# Patient Record
Sex: FEMALE | Race: WHITE | NOT HISPANIC OR LATINO | Employment: UNEMPLOYED | URBAN - METROPOLITAN AREA
[De-identification: names, ages, dates, MRNs, and addresses within clinical notes are randomized per-mention and may not be internally consistent; named-entity substitution may affect disease eponyms.]

---

## 2021-08-20 ENCOUNTER — HOSPITAL ENCOUNTER (EMERGENCY)
Facility: HOSPITAL | Age: 26
Discharge: HOME | End: 2021-08-20
Admitting: EMERGENCY MEDICINE

## 2021-08-20 ENCOUNTER — APPOINTMENT (OUTPATIENT)
Dept: ULTRASOUND IMAGING | Facility: HOSPITAL | Age: 26
End: 2021-08-20

## 2021-08-20 VITALS
TEMPERATURE: 97.6 F | RESPIRATION RATE: 16 BRPM | WEIGHT: 102.51 LBS | BODY MASS INDEX: 18.16 KG/M2 | SYSTOLIC BLOOD PRESSURE: 110 MMHG | HEIGHT: 63 IN | DIASTOLIC BLOOD PRESSURE: 80 MMHG | HEART RATE: 80 BPM | OXYGEN SATURATION: 100 %

## 2021-08-20 DIAGNOSIS — O41.8X10 SUBCHORIONIC HEMORRHAGE OF PLACENTA IN FIRST TRIMESTER, SINGLE OR UNSPECIFIED FETUS: ICD-10-CM

## 2021-08-20 DIAGNOSIS — N93.9 VAGINAL BLEEDING: ICD-10-CM

## 2021-08-20 DIAGNOSIS — R11.2 NAUSEA AND VOMITING, INTRACTABILITY OF VOMITING NOT SPECIFIED, UNSPECIFIED VOMITING TYPE: Primary | ICD-10-CM

## 2021-08-20 DIAGNOSIS — O46.8X1 SUBCHORIONIC HEMORRHAGE OF PLACENTA IN FIRST TRIMESTER, SINGLE OR UNSPECIFIED FETUS: ICD-10-CM

## 2021-08-20 LAB
ABO GROUP BLD: NORMAL
ANION GAP SERPL CALCULATED.3IONS-SCNC: 16 MMOL/L (ref 5–15)
BACTERIA UR QL AUTO: ABNORMAL /HPF
BASOPHILS # BLD AUTO: 0 10*3/MM3 (ref 0–0.2)
BASOPHILS NFR BLD AUTO: 0.6 % (ref 0–1.5)
BILIRUB UR QL STRIP: NEGATIVE
BUN SERPL-MCNC: 8 MG/DL (ref 6–20)
BUN/CREAT SERPL: 14.5 (ref 7–25)
CALCIUM SPEC-SCNC: 9.5 MG/DL (ref 8.6–10.5)
CHLORIDE SERPL-SCNC: 101 MMOL/L (ref 98–107)
CLARITY UR: ABNORMAL
CO2 SERPL-SCNC: 19 MMOL/L (ref 22–29)
COLOR UR: ABNORMAL
CREAT SERPL-MCNC: 0.55 MG/DL (ref 0.57–1)
DEPRECATED RDW RBC AUTO: 38.9 FL (ref 37–54)
EOSINOPHIL # BLD AUTO: 0 10*3/MM3 (ref 0–0.4)
EOSINOPHIL NFR BLD AUTO: 0 % (ref 0.3–6.2)
ERYTHROCYTE [DISTWIDTH] IN BLOOD BY AUTOMATED COUNT: 12.7 % (ref 12.3–15.4)
GFR SERPL CREATININE-BSD FRML MDRD: 136 ML/MIN/1.73
GLUCOSE SERPL-MCNC: 89 MG/DL (ref 65–99)
GLUCOSE UR STRIP-MCNC: NEGATIVE MG/DL
HCG INTACT+B SERPL-ACNC: NORMAL MIU/ML
HCT VFR BLD AUTO: 40.1 % (ref 34–46.6)
HGB BLD-MCNC: 13.8 G/DL (ref 12–15.9)
HGB UR QL STRIP.AUTO: NEGATIVE
HYALINE CASTS UR QL AUTO: ABNORMAL /LPF
KETONES UR QL STRIP: ABNORMAL
LEUKOCYTE ESTERASE UR QL STRIP.AUTO: NEGATIVE
LYMPHOCYTES # BLD AUTO: 1.2 10*3/MM3 (ref 0.7–3.1)
LYMPHOCYTES NFR BLD AUTO: 15.7 % (ref 19.6–45.3)
MCH RBC QN AUTO: 30 PG (ref 26.6–33)
MCHC RBC AUTO-ENTMCNC: 34.4 G/DL (ref 31.5–35.7)
MCV RBC AUTO: 87.2 FL (ref 79–97)
MONOCYTES # BLD AUTO: 0.5 10*3/MM3 (ref 0.1–0.9)
MONOCYTES NFR BLD AUTO: 6.3 % (ref 5–12)
MUCOUS THREADS URNS QL MICRO: ABNORMAL /HPF
NEUTROPHILS NFR BLD AUTO: 5.9 10*3/MM3 (ref 1.7–7)
NEUTROPHILS NFR BLD AUTO: 77.4 % (ref 42.7–76)
NITRITE UR QL STRIP: NEGATIVE
NRBC BLD AUTO-RTO: 0 /100 WBC (ref 0–0.2)
PH UR STRIP.AUTO: 6 [PH] (ref 5–8)
PLATELET # BLD AUTO: 254 10*3/MM3 (ref 140–450)
PMV BLD AUTO: 8.8 FL (ref 6–12)
POTASSIUM SERPL-SCNC: 3.6 MMOL/L (ref 3.5–5.2)
PROT UR QL STRIP: ABNORMAL
RBC # BLD AUTO: 4.59 10*6/MM3 (ref 3.77–5.28)
RBC # UR: ABNORMAL /HPF
REF LAB TEST METHOD: ABNORMAL
RH BLD: POSITIVE
SARS-COV-2 RNA PNL SPEC NAA+PROBE: NOT DETECTED
SODIUM SERPL-SCNC: 136 MMOL/L (ref 136–145)
SP GR UR STRIP: >=1.03 (ref 1–1.03)
SQUAMOUS #/AREA URNS HPF: ABNORMAL /HPF
UROBILINOGEN UR QL STRIP: ABNORMAL
WBC # BLD AUTO: 7.6 10*3/MM3 (ref 3.4–10.8)
WBC UR QL AUTO: ABNORMAL /HPF

## 2021-08-20 PROCEDURE — 86901 BLOOD TYPING SEROLOGIC RH(D): CPT | Performed by: NURSE PRACTITIONER

## 2021-08-20 PROCEDURE — 85025 COMPLETE CBC W/AUTO DIFF WBC: CPT | Performed by: NURSE PRACTITIONER

## 2021-08-20 PROCEDURE — 86900 BLOOD TYPING SEROLOGIC ABO: CPT | Performed by: NURSE PRACTITIONER

## 2021-08-20 PROCEDURE — 84702 CHORIONIC GONADOTROPIN TEST: CPT | Performed by: NURSE PRACTITIONER

## 2021-08-20 PROCEDURE — 81001 URINALYSIS AUTO W/SCOPE: CPT | Performed by: NURSE PRACTITIONER

## 2021-08-20 PROCEDURE — 87635 SARS-COV-2 COVID-19 AMP PRB: CPT | Performed by: NURSE PRACTITIONER

## 2021-08-20 PROCEDURE — 93976 VASCULAR STUDY: CPT

## 2021-08-20 PROCEDURE — 25010000002 METOCLOPRAMIDE PER 10 MG: Performed by: NURSE PRACTITIONER

## 2021-08-20 PROCEDURE — 99283 EMERGENCY DEPT VISIT LOW MDM: CPT

## 2021-08-20 PROCEDURE — 96374 THER/PROPH/DIAG INJ IV PUSH: CPT

## 2021-08-20 PROCEDURE — 76801 OB US < 14 WKS SINGLE FETUS: CPT

## 2021-08-20 PROCEDURE — 80048 BASIC METABOLIC PNL TOTAL CA: CPT | Performed by: NURSE PRACTITIONER

## 2021-08-20 RX ORDER — METOCLOPRAMIDE 10 MG/1
10 TABLET ORAL 3 TIMES DAILY PRN
Qty: 12 TABLET | Refills: 0 | Status: SHIPPED | OUTPATIENT
Start: 2021-08-20

## 2021-08-20 RX ORDER — METOCLOPRAMIDE HYDROCHLORIDE 5 MG/ML
10 INJECTION INTRAMUSCULAR; INTRAVENOUS ONCE
Status: COMPLETED | OUTPATIENT
Start: 2021-08-20 | End: 2021-08-20

## 2021-08-20 RX ORDER — SODIUM CHLORIDE 0.9 % (FLUSH) 0.9 %
10 SYRINGE (ML) INJECTION AS NEEDED
Status: DISCONTINUED | OUTPATIENT
Start: 2021-08-20 | End: 2021-08-20 | Stop reason: HOSPADM

## 2021-08-20 RX ADMIN — METOCLOPRAMIDE 10 MG: 5 INJECTION, SOLUTION INTRAMUSCULAR; INTRAVENOUS at 11:15

## 2021-08-20 RX ADMIN — SODIUM CHLORIDE 1000 ML: 9 INJECTION, SOLUTION INTRAVENOUS at 11:15

## 2021-08-20 NOTE — DISCHARGE INSTRUCTIONS
Take Reglan as prescribed. Drink plenty of fluids. Pelvic rest. Close follow-up with your OB/GYN. Call them today for an appointment. Return for new or worsening symptoms.

## 2021-08-20 NOTE — ED NOTES
Pt came into the ER states she is 7ld5ryik pregnant. Pt woke up this morning and had dark red vaginal bleeding. Pt reports her first time being pregnant.      Krystle Shaw RN  08/20/21 1127

## 2021-08-20 NOTE — ED PROVIDER NOTES
"Subjective   Patient is a 26-year old white female with complaints of nausea and vomiting since yesterday around noon.  She reports multiple episodes of vomiting this morning with continued nausea.  She denies diarrhea.  This morning she noticed blood in the toilet \"that wasn't only there when I wiped.\" She is unsure how much she bled but does not think it would be enough to soak a pad.  She states she is had no further bleeding.  She reports being 7-8 weeks pregnant by quantitative HCG blood test at her PCP. She reports dizziness upon standing and is feeling weak but thinks that is due to the vomiting. She has only had one episode of intercourse during her pregnancy and reports having O+ blood type. This is her first pregnancy and she does not have an established OBGYN. She reports no significant history.  She complains of some soreness in her upper abdomen which she thinks is from vomiting.  She denies any lower abdominal pain or cramping.  She denies any back pain.  She denies any urinary complaint.           Review of Systems   Constitutional: Negative for fever.   Gastrointestinal: Positive for nausea and vomiting.   Genitourinary: Positive for vaginal bleeding. Negative for dysuria, flank pain, genital sores and vaginal discharge.   Neurological: Positive for dizziness and weakness. Negative for headaches.       No past medical history on file.    No Known Allergies    No past surgical history on file.    No family history on file.    Social History     Socioeconomic History   • Marital status:      Spouse name: Not on file   • Number of children: Not on file   • Years of education: Not on file   • Highest education level: Not on file           Objective   Physical Exam  Exam conducted with a chaperone present.   Constitutional:       Appearance: Normal appearance.   HENT:      Head: Normocephalic.      Nose: Nose normal.   Eyes:      Pupils: Pupils are equal, round, and reactive to light. "   Cardiovascular:      Rate and Rhythm: Tachycardia present.      Pulses: Normal pulses.      Heart sounds: Normal heart sounds.   Pulmonary:      Effort: Pulmonary effort is normal.      Breath sounds: Normal breath sounds.   Abdominal:      General: Abdomen is flat.      Palpations: Abdomen is soft.   Genitourinary:     General: Normal vulva.      Labia:         Right: No rash or injury.         Left: No rash or injury.       Vagina: No vaginal discharge.   Musculoskeletal:      Cervical back: Normal range of motion.   Skin:     General: Skin is warm and dry.   Neurological:      Mental Status: She is alert.         Procedures           ED Course      Labs Reviewed   BASIC METABOLIC PANEL - Abnormal; Notable for the following components:       Result Value    Creatinine 0.55 (*)     CO2 19.0 (*)     Anion Gap 16.0 (*)     All other components within normal limits    Narrative:     GFR Normal >60  Chronic Kidney Disease <60  Kidney Failure <15     URINALYSIS W/ CULTURE IF INDICATED - Abnormal; Notable for the following components:    Color, UA Dark Yellow (*)     Appearance, UA Cloudy (*)     Ketones, UA >=160 mg/dL (4+) (*)     Protein,  mg/dL (2+) (*)     All other components within normal limits   CBC WITH AUTO DIFFERENTIAL - Abnormal; Notable for the following components:    Neutrophil % 77.4 (*)     Lymphocyte % 15.7 (*)     Eosinophil % 0.0 (*)     All other components within normal limits   URINALYSIS, MICROSCOPIC ONLY - Abnormal; Notable for the following components:    RBC, UA 0-2 (*)     WBC, UA 3-5 (*)     Bacteria, UA Trace (*)     Squamous Epithelial Cells, UA 3-6 (*)     Mucus, UA Moderate/2+ (*)     All other components within normal limits   COVID-19,CEPHEID/VIRGEN/BDMAX,COR/CHARLIE/PAD/ROSIE IN-HOUSE,NP SWAB IN TRANSPORT MEDIA 3-4 HR TAT, RT-PCR - Normal    Narrative:     Fact sheet for providers: https://www.fda.gov/media/642900/download     Fact sheet for patients:  https://www.fda.gov/media/721479/download  Fact sheet for providers: https://www.fda.gov/media/286316/download    Fact sheet for patients: https://www.fda.gov/media/605349/download    Test performed by PCR.   HCG, QUANTITATIVE, PREGNANCY    Narrative:     HCG Ranges by Gestational Age    Females - non-pregnant premenopausal   </= 1mIU/mL HCG  Females - postmenopausal               </= 7mIU/mL HCG    3 Weeks         5.8 -    71.2 mIU/mL  4 Weeks         9.5 -     750 mIU/mL  5 Weeks         217 -   7,138 mIU/mL  6 Weeks         158 -  31,795 mIU/mL  7 Weeks       3,697 - 163,563 mIU/mL  8 Weeks      32,065 - 149,571 mIU/mL  9 Weeks      63,803 - 151,410 mIU/mL  10 Weeks     46,509 - 186,977 mIU/mL  12 Weeks     27,832 - 210,612 mIU/mL  14 Weeks     13,950 -  62,530 mIU/mL  15 Weeks     12,039 -  70,971 mIU/mL  16 Weeks      9,040 -  56,451 mIU/mL  17 Weeks      8,175 -  55,868 mIU/mL  18 Weeks      8,099 -  58,176 mIU/mL  Results may be falsely decreased if patient taking Biotin.     ABO/RH   CBC AND DIFFERENTIAL    Narrative:     The following orders were created for panel order CBC & Differential.  Procedure                               Abnormality         Status                     ---------                               -----------         ------                     CBC Auto Differential[[id number]]        Abnormal            Final result                 Please view results for these tests on the individual orders.     US Ob < 14 Weeks Single or First Gestation    Result Date: 8/20/2021   IMPRESSION:  1.  Single living intrauterine pregnancy with an estimated gestational age of 6 weeks and 6 days based on crown-rump length. 2.  Small subchorionic hemorrhage measuring 1.7 x 0.4 cm.  Electronically Signed By-Abelardo Lieberman MD On:8/20/2021 1:39 PM This report was finalized on [id number] by  Abelardo Lieberman MD.    Medications   sodium chloride 0.9 % flush 10 mL (has no administration in time range)   sodium  "chloride 0.9 % bolus 1,000 mL (1,000 mL Intravenous New Bag 8/20/21 1115)   metoclopramide (REGLAN) injection 10 mg (10 mg Intravenous Given 8/20/21 1115)                                          MDM  Number of Diagnoses or Management Options  Nausea and vomiting, intractability of vomiting not specified, unspecified vomiting type  Subchorionic hemorrhage of placenta in first trimester, single or unspecified fetus  Vaginal bleeding  Diagnosis management comments: Comorbidities: 7 to 8 weeks pregnant  Differentials: Dehydration, hyperemesis gravidarum, electrolyte disturbance, threatened AB, spontaneous AB;this list is not all inclusive and does not constitute the entirety of considered causes  Discussion with provider:  Radiology interpretation: X-rays reviewed by me and interpreted by radiologist: As above  Lab interpretation: Labs viewed by me significant for: As above    Attempted pelvic exam patient did not tolerate due to pain with speculum.    Patient had IV established.  She had labs and ultrasound obtained.  She was given a fluid bolus as well as Reglan for nausea.    Work-up: CBC is gross unremarkable. Metabolic panel also grossly unremarkable. Urinalysis shows greater than 160 ketones but no blood or sign of infection. Serum hCG 176,000. Covid negative.  Pregnancy ultrasound shows single living intrauterine pregnancy with an estimated gestational age of 6 weeks and 6 days with fetal heart rate 132 bpm. Small subchorionic hemorrhage measuring 1.7 x 0.4 cm.    On reexamination patient is resting comfortably. She states she is feeling better after Reglan. She declined any further IV fluids. She only received approximately 100 cc of saline here as she stated it was \"too cold\" and had the nurse discontinue it. She also declines any further attempts at pelvic exam.    She is well-appearing and in no distress. She has stable vital signs. She has had no further vaginal bleeding. She denies any abdominal or pelvic " pain currently.    Diagnosis and treatment plan discussed with patient.  Patient agreeable to plan.   I discussed findings with patient who voices understanding of discharge instructions, signs and symptoms requiring return to ED; discharged improved and in stable condition with follow up for re-evaluation.  Prescription for Reglan.       Amount and/or Complexity of Data Reviewed  Clinical lab tests: ordered and reviewed  Tests in the radiology section of CPT®: ordered and reviewed    Patient Progress  Patient progress: stable      Final diagnoses:   Nausea and vomiting, intractability of vomiting not specified, unspecified vomiting type   Vaginal bleeding   Subchorionic hemorrhage of placenta in first trimester, single or unspecified fetus       ED Disposition  ED Disposition     ED Disposition Condition Comment    Discharge Stable           Your OB/GYN    Schedule an appointment as soon as possible for a visit       Joanne Ignacio MD  On license of [facility] [place] [address] [phone]    Schedule an appointment as soon as possible for a visit            Medication List      New Prescriptions    metoclopramide 10 MG tablet  Commonly known as: REGLAN  Take 1 tablet by mouth 3 (Three) Times a Day As Needed (nausea).           Where to Get Your Medications      These medications were sent to [id number] - [place], [place] - [address] [address] AT [facility] [address] - [phone] [facility] [phone] fx [address] [place] [address]    Phone: [phone]   · metoclopramide 10 MG tablet          Denise Riley APRN  08/20/21 6606

## 2021-09-17 LAB — EXTERNAL RUBELLA QUALITATIVE: NORMAL

## 2022-03-12 ENCOUNTER — HOSPITAL ENCOUNTER (INPATIENT)
Facility: HOSPITAL | Age: 27
LOS: 2 days | Discharge: HOME | End: 2022-03-14
Attending: OBSTETRICS & GYNECOLOGY | Admitting: OBSTETRICS & GYNECOLOGY

## 2022-03-12 PROBLEM — O99.340 ANXIETY DURING PREGNANCY: Status: ACTIVE | Noted: 2021-09-16

## 2022-03-12 PROBLEM — F41.9 ANXIETY DURING PREGNANCY: Status: ACTIVE | Noted: 2021-09-16

## 2022-03-12 PROBLEM — A74.9 CHLAMYDIA: Status: ACTIVE | Noted: 2021-10-14

## 2022-03-12 PROBLEM — Z72.0 TOBACCO USE: Status: ACTIVE | Noted: 2022-02-24

## 2022-03-12 LAB
ABO GROUP BLD: NORMAL
ABO GROUP BLD: NORMAL
BLD GP AB SCN SERPL QL: NEGATIVE
DEPRECATED RDW RBC AUTO: 38.8 FL (ref 37–54)
ERYTHROCYTE [DISTWIDTH] IN BLOOD BY AUTOMATED COUNT: 12.7 % (ref 12.3–15.4)
HCT VFR BLD AUTO: 29.5 % (ref 34–46.6)
HGB BLD-MCNC: 9.8 G/DL (ref 12–15.9)
MCH RBC QN AUTO: 28.1 PG (ref 26.6–33)
MCHC RBC AUTO-ENTMCNC: 33.2 G/DL (ref 31.5–35.7)
MCV RBC AUTO: 84.5 FL (ref 79–97)
PLATELET # BLD AUTO: 245 10*3/MM3 (ref 140–450)
PMV BLD AUTO: 10.1 FL (ref 6–12)
RBC # BLD AUTO: 3.49 10*6/MM3 (ref 3.77–5.28)
RH BLD: POSITIVE
RH BLD: POSITIVE
SARS-COV-2 RNA PNL SPEC NAA+PROBE: NOT DETECTED
T&S EXPIRATION DATE: NORMAL
WBC NRBC COR # BLD: 20.96 10*3/MM3 (ref 3.4–10.8)

## 2022-03-12 PROCEDURE — 25010000002 KETOROLAC TROMETHAMINE PER 15 MG: Performed by: OBSTETRICS & GYNECOLOGY

## 2022-03-12 PROCEDURE — 88307 TISSUE EXAM BY PATHOLOGIST: CPT | Performed by: OBSTETRICS & GYNECOLOGY

## 2022-03-12 PROCEDURE — 86900 BLOOD TYPING SEROLOGIC ABO: CPT | Performed by: OBSTETRICS & GYNECOLOGY

## 2022-03-12 PROCEDURE — 86901 BLOOD TYPING SEROLOGIC RH(D): CPT

## 2022-03-12 PROCEDURE — 86900 BLOOD TYPING SEROLOGIC ABO: CPT

## 2022-03-12 PROCEDURE — U0004 COV-19 TEST NON-CDC HGH THRU: HCPCS | Performed by: OBSTETRICS & GYNECOLOGY

## 2022-03-12 PROCEDURE — 85027 COMPLETE CBC AUTOMATED: CPT | Performed by: OBSTETRICS & GYNECOLOGY

## 2022-03-12 PROCEDURE — 86901 BLOOD TYPING SEROLOGIC RH(D): CPT | Performed by: OBSTETRICS & GYNECOLOGY

## 2022-03-12 PROCEDURE — 86850 RBC ANTIBODY SCREEN: CPT | Performed by: OBSTETRICS & GYNECOLOGY

## 2022-03-12 RX ORDER — DOCUSATE SODIUM 100 MG/1
100 CAPSULE, LIQUID FILLED ORAL 2 TIMES DAILY
Status: DISCONTINUED | OUTPATIENT
Start: 2022-03-12 | End: 2022-03-14 | Stop reason: HOSPADM

## 2022-03-12 RX ORDER — CALCIUM CARBONATE 200(500)MG
2 TABLET,CHEWABLE ORAL 3 TIMES DAILY PRN
Status: DISCONTINUED | OUTPATIENT
Start: 2022-03-12 | End: 2022-03-14 | Stop reason: HOSPADM

## 2022-03-12 RX ORDER — ONDANSETRON 4 MG/1
4 TABLET, FILM COATED ORAL EVERY 6 HOURS PRN
Status: DISCONTINUED | OUTPATIENT
Start: 2022-03-12 | End: 2022-03-14 | Stop reason: HOSPADM

## 2022-03-12 RX ORDER — NICOTINE 21 MG/24HR
1 PATCH, TRANSDERMAL 24 HOURS TRANSDERMAL EVERY 24 HOURS
Status: DISCONTINUED | OUTPATIENT
Start: 2022-03-12 | End: 2022-03-14 | Stop reason: HOSPADM

## 2022-03-12 RX ORDER — HYDROCODONE BITARTRATE AND ACETAMINOPHEN 5; 325 MG/1; MG/1
1 TABLET ORAL EVERY 4 HOURS PRN
Status: DISCONTINUED | OUTPATIENT
Start: 2022-03-12 | End: 2022-03-14 | Stop reason: HOSPADM

## 2022-03-12 RX ORDER — ONDANSETRON 2 MG/ML
4 INJECTION INTRAMUSCULAR; INTRAVENOUS EVERY 6 HOURS PRN
Status: DISCONTINUED | OUTPATIENT
Start: 2022-03-12 | End: 2022-03-14 | Stop reason: HOSPADM

## 2022-03-12 RX ORDER — SODIUM CHLORIDE 0.9 % (FLUSH) 0.9 %
1-10 SYRINGE (ML) INJECTION AS NEEDED
Status: DISCONTINUED | OUTPATIENT
Start: 2022-03-12 | End: 2022-03-14 | Stop reason: HOSPADM

## 2022-03-12 RX ORDER — IBUPROFEN 600 MG/1
600 TABLET ORAL EVERY 6 HOURS SCHEDULED
Status: DISCONTINUED | OUTPATIENT
Start: 2022-03-12 | End: 2022-03-14 | Stop reason: SDUPTHER

## 2022-03-12 RX ORDER — OXYTOCIN-SODIUM CHLORIDE 0.9% IV SOLN 30 UNIT/500ML 30-0.9/5 UT/ML-%
50 SOLUTION INTRAVENOUS CONTINUOUS
Status: DISCONTINUED | OUTPATIENT
Start: 2022-03-12 | End: 2022-03-14 | Stop reason: HOSPADM

## 2022-03-12 RX ORDER — KETOROLAC TROMETHAMINE 30 MG/ML
30 INJECTION, SOLUTION INTRAMUSCULAR; INTRAVENOUS ONCE
Status: COMPLETED | OUTPATIENT
Start: 2022-03-12 | End: 2022-03-12

## 2022-03-12 RX ORDER — SERTRALINE HYDROCHLORIDE 100 MG/1
100 TABLET, FILM COATED ORAL DAILY
COMMUNITY

## 2022-03-12 RX ORDER — SERTRALINE HYDROCHLORIDE 100 MG/1
100 TABLET, FILM COATED ORAL DAILY
Status: DISCONTINUED | OUTPATIENT
Start: 2022-03-12 | End: 2022-03-14 | Stop reason: HOSPADM

## 2022-03-12 RX ADMIN — DOCUSATE SODIUM 100 MG: 100 CAPSULE, LIQUID FILLED ORAL at 20:59

## 2022-03-12 RX ADMIN — WITCH HAZEL 1 PAD: 500 SOLUTION RECTAL; TOPICAL at 14:59

## 2022-03-12 RX ADMIN — OXYTOCIN 50 ML/HR: 10 INJECTION, SOLUTION INTRAMUSCULAR; INTRAVENOUS at 15:00

## 2022-03-12 RX ADMIN — IBUPROFEN 600 MG: 600 TABLET ORAL at 17:45

## 2022-03-12 RX ADMIN — Medication: at 14:59

## 2022-03-12 RX ADMIN — KETOROLAC TROMETHAMINE 30 MG: 30 INJECTION, SOLUTION INTRAMUSCULAR; INTRAVENOUS at 12:42

## 2022-03-12 RX ADMIN — IBUPROFEN 600 MG: 600 TABLET ORAL at 23:45

## 2022-03-12 RX ADMIN — SERTRALINE 100 MG: 100 TABLET, FILM COATED ORAL at 16:01

## 2022-03-12 RX ADMIN — NICOTINE 1 PATCH: 21 PATCH, EXTENDED RELEASE TRANSDERMAL at 15:00

## 2022-03-12 NOTE — H&P
MARTINE Cobb  Obstetric History and Physical    Chief Complaint   Patient presents with   • home delivery     Pt. Brought in by EMS. Home delivery       Subjective     Patient is a 26 y.o. female  currently at 36w0d, who was brought in by EMS following home delivery.  Placenta delivered in route.  Patient reports that she started having contractions at 0900 this morning.    Her prenatal care is at [facility].  Her previous obstetric/gynecological history is noted for being unremarkable        Prenatal Information:  Prenatal Results     POC Urine Glucose/Protein     Test Value Reference Range Date Time    Urine Glucose        Urine Protein              Initial Prenatal Labs     Test Value Reference Range Date Time    Hemoglobin ^ 11.4 g/dL 12.0 - 16.0 22 1401      ^ 12.5 g/dL 12.0 - 16.0 21 1401       13.8 g/dL 12.0 - 15.9 21 1115    Hematocrit ^ 34.8 % 36.0 - 46.0 22 1401      ^ 37.7 % 36.0 - 46.0 21 1401       40.1 % 34.0 - 46.6 21 1115    Platelets ^ 227 10*3/uL 140 - 440 22 1401      ^ 245 10*3/uL 140 - 440 21 1401       254 10*3/mm3 140 - 450 21 1115    Rubella IgG        Hepatitis B SAg ^ Nonreactive  Nonreactive 21 1401    Hepatitis C Ab ^ Nonreactive  Nonreactive 21 1401    RPR        ABO  O   21 1115    Rh  Positive   21 1115    Antibody Screen ^ Negative   22 1401      ^ Negative   21 1401    HIV ^ Nonreactive  Nonreactive 21 1401    Urine Culture        Gonorrhea        Chlamydia        TSH        HgB A1c  ^ 4.4 % 4.3 - 5.6 21 1401          2nd and 3rd Trimester     Test Value Reference Range Date Time    Hemoglobin (repeated) ^ 11.4 g/dL 12.0 - 16.0 22 1401    Hematocrit (repeated) ^ 34.8 % 36.0 - 46.0 22 1401    Platelets  ^ 227 10*3/uL 140 - 440 22 1401      ^ 245 10*3/uL 140 - 440 21 1401       254 10*3/mm3 140 - 450 21 1115    GCT        Antibody Screen (repeated) ^  Negative   01/03/22 1401    GTT Fasting        GTT 1 Hr        GTT 2 Hr        GTT 3 Hr        Group B Strep              Drug Screening     Test Value Reference Range Date Time    Amphetamine Screen        Barbiturate Screen        Benzodiazepine Screen        Methadone Screen        Phencyclidine Screen        Opiates Screen        THC Screen        Cocaine Screen        Propoxyphene Screen        Buprenorphine Screen        Methamphetamine Screen        Oxycodone Screen        Tricyclic Antidepressants Screen              Other (Risk screening)     Test Value Reference Range Date Time    Varicella IgG ^ 0.9 AI  09/17/21 1401    Parvovirus IgG        CMV IgG        Cystic Fibrosis        Hemoglobin electrophoresis        NIPT        MSAFP-4        AFP (for NTD only)              Legend    ^: Historical                      External Prenatal Results     Pregnancy Outside Results - Transcribed From Office Records - See Scanned Records For Details     Test Value Date Time    ABO  O  08/20/21 1115    Rh  Positive  08/20/21 1115    Antibody Screen ^ Negative  01/03/22 1401      ^ Negative  09/17/21 1401    Varicella IgG ^ 0.9 AI 09/17/21 1401    Rubella       Hgb ^ 11.4 g/dL 01/03/22 1401      ^ 12.5 g/dL 09/17/21 1401       13.8 g/dL 08/20/21 1115    Hct ^ 34.8 % 01/03/22 1401      ^ 37.7 % 09/17/21 1401       40.1 % 08/20/21 1115    Glucose Fasting GTT       Glucose Tolerance Test 1 hour       Glucose Tolerance Test 3 hour       Gonorrhea (discrete)       Chlamydia (discrete)       RPR       VDRL       Syphilis Antibody ^ <0.2 AI 09/17/21 1401    HBsAg ^ Nonreactive  09/17/21 1401    Herpes Simplex Virus PCR       Herpes Simplex VIrus Culture       HIV ^ Nonreactive  09/17/21 1401    Hep C RNA Quant PCR       Hep C Antibody ^ Nonreactive  09/17/21 1401    AFP       Group B Strep       GBS Susceptibility to Clindamycin       GBS Susceptibility to Erythromycin       Fetal Fibronectin       Genetic Testing, Maternal  Blood             Drug Screening     Test Value Date Time    Urine Drug Screen       Amphetamine Screen       Barbiturate Screen       Benzodiazepine Screen       Methadone Screen       Phencyclidine Screen       Opiates Screen       THC Screen       Cocaine Screen       Propoxyphene Screen       Buprenorphine Screen       Methamphetamine Screen       Oxycodone Screen       Tricyclic Antidepressants Screen             Legend    ^: Historical                         Past OB History:     OB History    Para Term  AB Living   1 0 0 0 0 0   SAB IAB Ectopic Molar Multiple Live Births   0 0 0 0 0 0      # Outcome Date GA Lbr Heladio/2nd Weight Sex Delivery Anes PTL Lv   1 Current                Past Medical History: Past Medical History:   Diagnosis Date   • Anxiety       Past Surgical History Past Surgical History:   Procedure Laterality Date   • WISDOM TOOTH EXTRACTION        Family History: No family history on file.   Social History:  reports that she has been smoking cigarettes. She has a 2.00 pack-year smoking history. She does not have any smokeless tobacco history on file.   reports no history of alcohol use.   reports no history of drug use.        General ROS: Pertinent items are noted in HPI    Objective       Vital Signs Range for the last 24 hours  Temperature:     Temp Source:     BP:     Pulse:     Respirations:     SPO2:     O2 Amount (l/min):     O2 Devices     Weight: Weight:  [58.1 kg (128 lb)] 58.1 kg (128 lb)     Physical Examination: General appearance - alert, well appearing, and in no distress  Mental status - alert, oriented to person, place, and time  Chest - clear to auscultation, no wheezes, rales or rhonchi, symmetric air entry  Heart - normal rate, regular rhythm, normal S1, S2, no murmurs, rubs, clicks or gallops  Abdomen - soft, nontender, nondistended, no masses or organomegaly  Pelvic -uterus firm at U- 3 station  Minimal vaginal bleeding present.                                         GBS is unknown      Assessment/Plan       Postpartum care following vaginal delivery    A/P:  Patient is a 26-year-old  1 para 0 female at 36 weeks estimate gestational age who is status post home vaginal delivery.  Placenta delivered in route.  I did pelvic exam and there is no lacerations present.  Uterus was firm and there was minimal bleeding.  Will admit patient to postpartum for routine postpartum care.      Victorino Melvin MD  3/12/2022  12:44 EST

## 2022-03-12 NOTE — PLAN OF CARE
Goal Outcome Evaluation:    Problem: Adult Inpatient Plan of Care  Goal: Absence of Hospital-Acquired Illness or Injury  Intervention: Identify and Manage Fall Risk  Recent Flowsheet Documentation  Taken 3/12/2022 1745 by Emily Cantor RN  Safety Promotion/Fall Prevention: safety round/check completed  Taken 3/12/2022 1700 by Emily Cantor RN  Safety Promotion/Fall Prevention: safety round/check completed  Taken 3/12/2022 1435 by Emily Cantor RN  Safety Promotion/Fall Prevention: safety round/check completed  Taken 3/12/2022 1335 by Emiyl Cantor RN  Safety Promotion/Fall Prevention: safety round/check completed  Taken 3/12/2022 1220 by Emily Cantor RN  Safety Promotion/Fall Prevention: safety round/check completed     Problem: Adult Inpatient Plan of Care  Goal: Optimal Comfort and Wellbeing  Outcome: Ongoing, Progressing     Problem: Adult Inpatient Plan of Care  Goal: Readiness for Transition of Care  Outcome: Ongoing, Progressing  Intervention: Mutually Develop Transition Plan  Recent Flowsheet Documentation  Taken 3/12/2022 1252 by Emily Cantor RN  Transportation Concerns: none  Taken 3/12/2022 1250 by Emily Cantor RN  Equipment Currently Used at Home: none     Problem: Adult Inpatient Plan of Care  Goal: Readiness for Transition of Care  Intervention: Mutually Develop Transition Plan  Recent Flowsheet Documentation  Taken 3/12/2022 1252 by Emily Cantor RN  Transportation Concerns: none  Taken 3/12/2022 1250 by Emily Cantor RN  Equipment Currently Used at Home: none     Problem: Infection (Postpartum Vaginal Delivery)  Goal: Absence of Infection Signs/Symptoms  Outcome: Ongoing, Progressing  Intervention: Prevent or Manage Infection  Recent Flowsheet Documentation  Taken 3/12/2022 1745 by Emily Cantor RN  Perineal Care:   absorbent pad changed   perineum cleansed  Taken 3/12/2022 1505 by Emily Cantor RN  Perineal Care:   absorbent pad changed    perineum cleansed     Problem: Pain (Postpartum Vaginal Delivery)  Goal: Acceptable Pain Control  Outcome: Ongoing, Progressing       Plan of Care Reviewed With: patient      Progress: improving

## 2022-03-13 PROCEDURE — G0463 HOSPITAL OUTPT CLINIC VISIT: HCPCS

## 2022-03-13 RX ADMIN — SERTRALINE 100 MG: 100 TABLET, FILM COATED ORAL at 08:53

## 2022-03-13 RX ADMIN — NICOTINE 1 PATCH: 21 PATCH, EXTENDED RELEASE TRANSDERMAL at 15:26

## 2022-03-13 RX ADMIN — DOCUSATE SODIUM 100 MG: 100 CAPSULE, LIQUID FILLED ORAL at 21:19

## 2022-03-13 RX ADMIN — IBUPROFEN 600 MG: 600 TABLET ORAL at 17:26

## 2022-03-13 RX ADMIN — DOCUSATE SODIUM 100 MG: 100 CAPSULE, LIQUID FILLED ORAL at 08:53

## 2022-03-13 RX ADMIN — IBUPROFEN 600 MG: 600 TABLET ORAL at 06:19

## 2022-03-13 NOTE — PROGRESS NOTES
" Cobb  Vaginal Delivery Progress Note    Subjective   Postpartum Day 1: Vaginal Delivery    The patient feels well.  Her pain is well controlled with nonsteroidal anti-inflammatory drugs and Tylenol.   She is ambulating well.  Patient describes her bleeding as moderate lochia.    Breastfeeding: declines.    Objective     Vital Signs Range for the last 24 hours  Temperature: Temp:  [97.3 °F (36.3 °C)-98.8 °F (37.1 °C)] 97.3 °F (36.3 °C)   Temp Source: Temp src: Oral   BP: BP: (108-142)/(62-88) 119/75   Pulse: Heart Rate:  [70-96] 78   Respirations: Resp:  [16-20] 16   SPO2:     O2 Amount (l/min):     O2 Devices     Weight: Weight:  [58.1 kg (128 lb)] 58.1 kg (128 lb)     Admit Height:  Height: 160 cm (63\")      Physical Exam:  General:  no acute distress.  Abdomen: abdomen is soft without significant tenderness, masses, organomegaly or guarding. Fundus: appropriate, firm, non tender  Extremities: normal, atraumatic, no cyanosis, and trace edema.       Lab results reviewed:  Yes   Rubella:  No results found for: RUBELLAIGGIN Nurse Transcribed from prenatal record --    External Rubella Qual   Date Value Ref Range Status   09/17/2021 Immune  Final     Rh Status:    RH type   Date Value Ref Range Status   03/12/2022 Positive  Final     Immunizations:   Immunization History   Administered Date(s) Administered   • COVID-19 (PFIZER) PURPLE CAP 01/03/2022       Assessment/Plan       Postpartum care following vaginal delivery      Madeleineaustin Hook is Day 1  post-partum  Vaginal, Spontaneous   .      Plan:  Continue current care.      Victorino Melvin MD  3/13/2022  11:50 EDT  "

## 2022-03-13 NOTE — PLAN OF CARE
Problem: Adult Inpatient Plan of Care  Goal: Plan of Care Review  Outcome: Ongoing, Progressing  Goal: Patient-Specific Goal (Individualized)  Outcome: Ongoing, Progressing  Goal: Absence of Hospital-Acquired Illness or Injury  Outcome: Ongoing, Progressing  Goal: Optimal Comfort and Wellbeing  Outcome: Ongoing, Progressing  Goal: Readiness for Transition of Care  Outcome: Ongoing, Progressing     Problem: Adjustment to Role Transition (Postpartum Vaginal Delivery)  Goal: Successful Maternal Role Transition  Outcome: Ongoing, Progressing     Problem: Bleeding (Postpartum Vaginal Delivery)  Goal: Hemostasis  Outcome: Ongoing, Progressing     Problem: Infection (Postpartum Vaginal Delivery)  Goal: Absence of Infection Signs/Symptoms  Outcome: Ongoing, Progressing     Problem: Pain (Postpartum Vaginal Delivery)  Goal: Acceptable Pain Control  Outcome: Ongoing, Progressing     Problem: Urinary Retention (Postpartum Vaginal Delivery)  Goal: Effective Urinary Elimination  Outcome: Ongoing, Progressing     Problem: Anxiety  Goal: Anxiety Reduction or Resolution  Outcome: Ongoing, Progressing   Goal Outcome Evaluation:

## 2022-03-13 NOTE — PLAN OF CARE
Goal Outcome Evaluation:    Patient is up ad natalio and states that her pain has been minimal to none.  Patient has been firm without massage and bleeding has been light.

## 2022-03-14 VITALS
SYSTOLIC BLOOD PRESSURE: 101 MMHG | WEIGHT: 128 LBS | TEMPERATURE: 97.6 F | RESPIRATION RATE: 18 BRPM | DIASTOLIC BLOOD PRESSURE: 86 MMHG | HEART RATE: 65 BPM | HEIGHT: 63 IN | BODY MASS INDEX: 22.68 KG/M2

## 2022-03-14 RX ORDER — IBUPROFEN 600 MG/1
600 TABLET ORAL EVERY 6 HOURS SCHEDULED
Qty: 30 TABLET | Refills: 0 | Status: SHIPPED | OUTPATIENT
Start: 2022-03-14

## 2022-03-14 RX ORDER — IBUPROFEN 600 MG/1
600 TABLET ORAL EVERY 6 HOURS PRN
Status: DISCONTINUED | OUTPATIENT
Start: 2022-03-14 | End: 2022-03-14 | Stop reason: HOSPADM

## 2022-03-14 RX ADMIN — IBUPROFEN 600 MG: 600 TABLET ORAL at 00:13

## 2022-03-14 RX ADMIN — DOCUSATE SODIUM 100 MG: 100 CAPSULE, LIQUID FILLED ORAL at 08:09

## 2022-03-14 RX ADMIN — SERTRALINE 100 MG: 100 TABLET, FILM COATED ORAL at 08:09

## 2022-03-14 RX ADMIN — IBUPROFEN 600 MG: 600 TABLET ORAL at 05:55

## 2022-03-14 NOTE — DISCHARGE SUMMARY
MARTINE Cobb  Delivery Discharge Summary    Primary OB Clinician:     EDC: Estimated Date of Delivery: 22    Gestational Age:36w0d    Antepartum complications: none    Date of Delivery: 3/12/2022   Time of Delivery: 11:14 AM     Delivered By:  Home Delivery    Delivery Type: Vaginal, Spontaneous      Tubal Ligation: n/a    Baby:female  infant;   Apgar:    @ 1 minute /   Apgar:    @ 5 minutes   Weight: 2526 g (5 lb 9.1 oz)    Length: 17     Anesthesia: None      Intrapartum complications: None    Laceration: No    Episiotomy: No    Placenta: Spontaneous     Feeding method: Breastfeeding Status: No    Discharge Date: 3/14/2022; Discharge Time: 08:16 EDT        Hospital Course and discharge exam:  Patient is 26-year-old  1 now para 1 female at 36 weeks estimate gestational age who had a home delivery.  She was brought in by EMS.  Exam at the time of admission showed no lacerations.  Postpartum course for patient has been uncomplicated.  She is tolerating p.o. intake with any difficulty.  She reports minimal vaginal bleeding.  At the time of exam:  She is afebrile vital signs stable.  Lungs are clear to auscultation bilaterally.  Heart is regular rate and rhythm.  Abdomen soft nontender and the uterus is firm at U- 3 station.    Patient will be sent home on Motrin for pain control.  She can follow-up with her OB/GYN physician in 6 weeks for postpartum exam.  She will follow-up sooner if fever chills persistent nausea vomiting.    Follow-up appointment with Dr. Melvin.

## 2022-03-14 NOTE — PLAN OF CARE
Problem: Adult Inpatient Plan of Care  Goal: Plan of Care Review  Outcome: Ongoing, Progressing  Goal: Patient-Specific Goal (Individualized)  Outcome: Ongoing, Progressing  Goal: Absence of Hospital-Acquired Illness or Injury  Outcome: Ongoing, Progressing  Intervention: Identify and Manage Fall Risk  Recent Flowsheet Documentation  Taken 3/14/2022 0300 by Mary Chavez RN  Safety Promotion/Fall Prevention: safety round/check completed  Taken 3/14/2022 0200 by Mary Chavez RN  Safety Promotion/Fall Prevention: safety round/check completed  Taken 3/14/2022 0100 by Mary Chavez RN  Safety Promotion/Fall Prevention: safety round/check completed  Taken 3/14/2022 0000 by Mary Chavez RN  Safety Promotion/Fall Prevention: safety round/check completed  Taken 3/13/2022 2300 by Mary Chavez RN  Safety Promotion/Fall Prevention: safety round/check completed  Taken 3/13/2022 2200 by Mary Chavez RN  Safety Promotion/Fall Prevention: safety round/check completed  Taken 3/13/2022 2130 by Mary Chavez RN  Safety Promotion/Fall Prevention: safety round/check completed  Taken 3/13/2022 2000 by Mary Chavez RN  Safety Promotion/Fall Prevention: safety round/check completed  Intervention: Prevent and Manage VTE (Venous Thromboembolism) Risk  Recent Flowsheet Documentation  Taken 3/13/2022 2130 by Mary Chavez RN  Activity Management: up ad natalio  Intervention: Prevent Infection  Recent Flowsheet Documentation  Taken 3/13/2022 2130 by Mary Chavez RN  Infection Prevention:   visitors restricted/screened   hand hygiene promoted   personal protective equipment utilized   rest/sleep promoted  Goal: Optimal Comfort and Wellbeing  Outcome: Ongoing, Progressing  Intervention: Monitor Pain and Promote Comfort  Recent Flowsheet Documentation  Taken 3/13/2022 2130 by Mary Chavez RN  Pain Management Interventions:   see MAR   quiet environment facilitated  Intervention: Provide Person-Centered  Care  Recent Flowsheet Documentation  Taken 3/13/2022 2130 by Mary Chavez RN  Trust Relationship/Rapport:   care explained   choices provided   emotional support provided   questions answered   questions encouraged  Goal: Readiness for Transition of Care  Outcome: Ongoing, Progressing     Problem: Adjustment to Role Transition (Postpartum Vaginal Delivery)  Goal: Successful Maternal Role Transition  Outcome: Ongoing, Progressing  Intervention: Support Maternal Role Transition  Recent Flowsheet Documentation  Taken 3/13/2022 2130 by Mary Chavez RN  Supportive Measures:   active listening utilized   positive reinforcement provided   self-care encouraged   relaxation techniques promoted  Parent/Child Attachment Promotion:   face-to-face positioning promoted   interaction encouraged   parent/caregiver presence encouraged   participation in care promoted   skin-to-skin contact encouraged     Problem: Bleeding (Postpartum Vaginal Delivery)  Goal: Hemostasis  Outcome: Ongoing, Progressing     Problem: Infection (Postpartum Vaginal Delivery)  Goal: Absence of Infection Signs/Symptoms  Outcome: Ongoing, Progressing  Intervention: Prevent or Manage Infection  Recent Flowsheet Documentation  Taken 3/13/2022 2130 by Mary Chavez RN  Perineal Care:   absorbent pad changed   perineum cleansed     Problem: Pain (Postpartum Vaginal Delivery)  Goal: Acceptable Pain Control  Outcome: Ongoing, Progressing  Intervention: Prevent or Manage Pain  Recent Flowsheet Documentation  Taken 3/13/2022 2130 by Mary Chavez RN  Pain Management Interventions:   see MAR   quiet environment facilitated     Problem: Urinary Retention (Postpartum Vaginal Delivery)  Goal: Effective Urinary Elimination  Outcome: Ongoing, Progressing     Problem: Anxiety  Goal: Anxiety Reduction or Resolution  Outcome: Ongoing, Progressing  Intervention: Promote Anxiety Reduction  Recent Flowsheet Documentation  Taken 3/13/2022 2130 by Mary Chavez  RN  Supportive Measures:   active listening utilized   positive reinforcement provided   self-care encouraged   relaxation techniques promoted  Family/Support System Care:   self-care encouraged   presence promoted   support provided   Goal Outcome Evaluation:

## 2022-03-16 LAB
CYTO UR: NORMAL
LAB AP CASE REPORT: NORMAL
LAB AP CLINICAL INFORMATION: NORMAL
PATH REPORT.FINAL DX SPEC: NORMAL
PATH REPORT.GROSS SPEC: NORMAL

## 2024-05-27 ENCOUNTER — HOSPITAL ENCOUNTER (OUTPATIENT)
Facility: HOSPITAL | Age: 29
Discharge: HOME | End: 2024-05-28
Attending: EMERGENCY MEDICINE | Admitting: INTERNAL MEDICINE
Payer: OTHER GOVERNMENT

## 2024-05-27 ENCOUNTER — APPOINTMENT (OUTPATIENT)
Dept: ULTRASOUND IMAGING | Facility: HOSPITAL | Age: 29
End: 2024-05-27
Payer: OTHER GOVERNMENT

## 2024-05-27 DIAGNOSIS — R11.2 NAUSEA AND VOMITING, UNSPECIFIED VOMITING TYPE: ICD-10-CM

## 2024-05-27 DIAGNOSIS — Z3A.01 LESS THAN 8 WEEKS GESTATION OF PREGNANCY: ICD-10-CM

## 2024-05-27 DIAGNOSIS — R55 SYNCOPE, UNSPECIFIED SYNCOPE TYPE: Primary | ICD-10-CM

## 2024-05-27 DIAGNOSIS — E86.0 DEHYDRATION: ICD-10-CM

## 2024-05-27 LAB
ALBUMIN SERPL-MCNC: 5.3 G/DL (ref 3.5–5.2)
ALBUMIN/GLOB SERPL: 1.7 G/DL
ALP SERPL-CCNC: 78 U/L (ref 39–117)
ALT SERPL W P-5'-P-CCNC: 8 U/L (ref 1–33)
ANION GAP SERPL CALCULATED.3IONS-SCNC: 18.4 MMOL/L (ref 5–15)
AST SERPL-CCNC: 24 U/L (ref 1–32)
BASOPHILS # BLD AUTO: 0.03 10*3/MM3 (ref 0–0.2)
BASOPHILS NFR BLD AUTO: 0.3 % (ref 0–1.5)
BILIRUB SERPL-MCNC: 0.8 MG/DL (ref 0–1.2)
BUN SERPL-MCNC: 9 MG/DL (ref 6–20)
BUN/CREAT SERPL: 14.1 (ref 7–25)
CALCIUM SPEC-SCNC: 10.3 MG/DL (ref 8.6–10.5)
CHLORIDE SERPL-SCNC: 97 MMOL/L (ref 98–107)
CO2 SERPL-SCNC: 19.6 MMOL/L (ref 22–29)
CREAT SERPL-MCNC: 0.64 MG/DL (ref 0.57–1)
DEPRECATED RDW RBC AUTO: 38.5 FL (ref 37–54)
EGFRCR SERPLBLD CKD-EPI 2021: 124.4 ML/MIN/1.73
EOSINOPHIL # BLD AUTO: 0.01 10*3/MM3 (ref 0–0.4)
EOSINOPHIL NFR BLD AUTO: 0.1 % (ref 0.3–6.2)
ERYTHROCYTE [DISTWIDTH] IN BLOOD BY AUTOMATED COUNT: 12.7 % (ref 12.3–15.4)
GLOBULIN UR ELPH-MCNC: 3.2 GM/DL
GLUCOSE SERPL-MCNC: 104 MG/DL (ref 65–99)
HCG INTACT+B SERPL-ACNC: NORMAL MIU/ML
HCT VFR BLD AUTO: 41.4 % (ref 34–46.6)
HGB BLD-MCNC: 14.5 G/DL (ref 12–15.9)
HOLD SPECIMEN: NORMAL
HOLD SPECIMEN: NORMAL
IMM GRANULOCYTES # BLD AUTO: 0.03 10*3/MM3 (ref 0–0.05)
IMM GRANULOCYTES NFR BLD AUTO: 0.3 % (ref 0–0.5)
LYMPHOCYTES # BLD AUTO: 2.14 10*3/MM3 (ref 0.7–3.1)
LYMPHOCYTES NFR BLD AUTO: 20.7 % (ref 19.6–45.3)
MCH RBC QN AUTO: 29.7 PG (ref 26.6–33)
MCHC RBC AUTO-ENTMCNC: 35 G/DL (ref 31.5–35.7)
MCV RBC AUTO: 84.7 FL (ref 79–97)
MONOCYTES # BLD AUTO: 0.93 10*3/MM3 (ref 0.1–0.9)
MONOCYTES NFR BLD AUTO: 9 % (ref 5–12)
NEUTROPHILS NFR BLD AUTO: 69.6 % (ref 42.7–76)
NEUTROPHILS NFR BLD AUTO: 7.21 10*3/MM3 (ref 1.7–7)
NRBC BLD AUTO-RTO: 0 /100 WBC (ref 0–0.2)
PLATELET # BLD AUTO: 287 10*3/MM3 (ref 140–450)
PMV BLD AUTO: 11 FL (ref 6–12)
POTASSIUM SERPL-SCNC: 3.2 MMOL/L (ref 3.5–5.2)
PROT SERPL-MCNC: 8.5 G/DL (ref 6–8.5)
RBC # BLD AUTO: 4.89 10*6/MM3 (ref 3.77–5.28)
SODIUM SERPL-SCNC: 135 MMOL/L (ref 136–145)
WBC NRBC COR # BLD AUTO: 10.35 10*3/MM3 (ref 3.4–10.8)
WHOLE BLOOD HOLD COAG: NORMAL
WHOLE BLOOD HOLD SPECIMEN: NORMAL

## 2024-05-27 PROCEDURE — 99285 EMERGENCY DEPT VISIT HI MDM: CPT

## 2024-05-27 PROCEDURE — 93005 ELECTROCARDIOGRAM TRACING: CPT

## 2024-05-27 PROCEDURE — 80053 COMPREHEN METABOLIC PANEL: CPT | Performed by: NURSE PRACTITIONER

## 2024-05-27 PROCEDURE — 93005 ELECTROCARDIOGRAM TRACING: CPT | Performed by: EMERGENCY MEDICINE

## 2024-05-27 PROCEDURE — 96375 TX/PRO/DX INJ NEW DRUG ADDON: CPT

## 2024-05-27 PROCEDURE — 85025 COMPLETE CBC W/AUTO DIFF WBC: CPT | Performed by: NURSE PRACTITIONER

## 2024-05-27 PROCEDURE — 96365 THER/PROPH/DIAG IV INF INIT: CPT

## 2024-05-27 PROCEDURE — 84702 CHORIONIC GONADOTROPIN TEST: CPT | Performed by: NURSE PRACTITIONER

## 2024-05-27 RX ORDER — SODIUM CHLORIDE 0.9 % (FLUSH) 0.9 %
10 SYRINGE (ML) INJECTION AS NEEDED
Status: DISCONTINUED | OUTPATIENT
Start: 2024-05-27 | End: 2024-05-28 | Stop reason: HOSPADM

## 2024-05-27 RX ORDER — ADENOSINE 3 MG/ML
6 INJECTION, SOLUTION INTRAVENOUS ONCE
Status: DISCONTINUED | OUTPATIENT
Start: 2024-05-27 | End: 2024-05-27

## 2024-05-27 RX ORDER — ADENOSINE 3 MG/ML
INJECTION, SOLUTION INTRAVENOUS
Status: DISCONTINUED
Start: 2024-05-27 | End: 2024-05-27 | Stop reason: WASHOUT

## 2024-05-27 NOTE — Clinical Note
Level of Care: Med/Surg [1]   Diagnosis: Syncope [206001]   Admitting Physician: JOANNA CHAWLA [[id number]]   Attending Physician: JAONNA CHAWLA [[id number]]   Bed Request Comments: cardiac monitor

## 2024-05-28 ENCOUNTER — APPOINTMENT (OUTPATIENT)
Dept: ULTRASOUND IMAGING | Facility: HOSPITAL | Age: 29
End: 2024-05-28
Payer: OTHER GOVERNMENT

## 2024-05-28 VITALS
TEMPERATURE: 98.1 F | SYSTOLIC BLOOD PRESSURE: 123 MMHG | OXYGEN SATURATION: 99 % | DIASTOLIC BLOOD PRESSURE: 81 MMHG | WEIGHT: 116.84 LBS | HEART RATE: 102 BPM | HEIGHT: 63 IN | RESPIRATION RATE: 22 BRPM | BODY MASS INDEX: 20.7 KG/M2

## 2024-05-28 PROBLEM — R11.2 NAUSEA AND VOMITING: Status: ACTIVE | Noted: 2024-05-28

## 2024-05-28 PROBLEM — Z34.90 PREGNANCY: Status: ACTIVE | Noted: 2024-05-28

## 2024-05-28 PROBLEM — R55 SYNCOPE: Status: ACTIVE | Noted: 2024-05-28

## 2024-05-28 LAB
ANION GAP SERPL CALCULATED.3IONS-SCNC: 14.8 MMOL/L (ref 5–15)
BACTERIA UR QL AUTO: ABNORMAL /HPF
BILIRUB UR QL STRIP: NEGATIVE
BUN SERPL-MCNC: 7 MG/DL (ref 6–20)
BUN/CREAT SERPL: 14.6 (ref 7–25)
CALCIUM SPEC-SCNC: 8.9 MG/DL (ref 8.6–10.5)
CHLORIDE SERPL-SCNC: 104 MMOL/L (ref 98–107)
CLARITY UR: ABNORMAL
CO2 SERPL-SCNC: 18.2 MMOL/L (ref 22–29)
COLOR UR: YELLOW
CREAT SERPL-MCNC: 0.48 MG/DL (ref 0.57–1)
EGFRCR SERPLBLD CKD-EPI 2021: 133.3 ML/MIN/1.73
GLUCOSE SERPL-MCNC: 99 MG/DL (ref 65–99)
GLUCOSE UR STRIP-MCNC: NEGATIVE MG/DL
HGB UR QL STRIP.AUTO: ABNORMAL
HYALINE CASTS UR QL AUTO: ABNORMAL /LPF
KETONES UR QL STRIP: ABNORMAL
LEUKOCYTE ESTERASE UR QL STRIP.AUTO: ABNORMAL
MAGNESIUM SERPL-MCNC: 2 MG/DL (ref 1.6–2.6)
NITRITE UR QL STRIP: NEGATIVE
PH UR STRIP.AUTO: 6 [PH] (ref 5–8)
POTASSIUM SERPL-SCNC: 3.2 MMOL/L (ref 3.5–5.2)
PROT UR QL STRIP: ABNORMAL
QT INTERVAL: 373 MS
QT INTERVAL: 379 MS
QTC INTERVAL: 465 MS
QTC INTERVAL: 474 MS
RBC # UR STRIP: ABNORMAL /HPF
REF LAB TEST METHOD: ABNORMAL
SODIUM SERPL-SCNC: 137 MMOL/L (ref 136–145)
SP GR UR STRIP: 1.03 (ref 1–1.03)
SQUAMOUS #/AREA URNS HPF: ABNORMAL /HPF
UROBILINOGEN UR QL STRIP: ABNORMAL
WBC # UR STRIP: ABNORMAL /HPF

## 2024-05-28 PROCEDURE — 83735 ASSAY OF MAGNESIUM: CPT | Performed by: NURSE PRACTITIONER

## 2024-05-28 PROCEDURE — G0378 HOSPITAL OBSERVATION PER HR: HCPCS

## 2024-05-28 PROCEDURE — 76817 TRANSVAGINAL US OBSTETRIC: CPT

## 2024-05-28 PROCEDURE — 76801 OB US < 14 WKS SINGLE FETUS: CPT

## 2024-05-28 PROCEDURE — 25010000002 CEFTRIAXONE PER 250 MG: Performed by: NURSE PRACTITIONER

## 2024-05-28 PROCEDURE — 25810000003 SODIUM CHLORIDE 0.9 % SOLUTION: Performed by: NURSE PRACTITIONER

## 2024-05-28 PROCEDURE — 81001 URINALYSIS AUTO W/SCOPE: CPT | Performed by: NURSE PRACTITIONER

## 2024-05-28 PROCEDURE — 80048 BASIC METABOLIC PNL TOTAL CA: CPT | Performed by: NURSE PRACTITIONER

## 2024-05-28 PROCEDURE — 25010000002 METOCLOPRAMIDE PER 10 MG: Performed by: NURSE PRACTITIONER

## 2024-05-28 PROCEDURE — 87086 URINE CULTURE/COLONY COUNT: CPT | Performed by: NURSE PRACTITIONER

## 2024-05-28 PROCEDURE — 93976 VASCULAR STUDY: CPT

## 2024-05-28 RX ORDER — SODIUM CHLORIDE 9 MG/ML
100 INJECTION, SOLUTION INTRAVENOUS CONTINUOUS
Status: DISCONTINUED | OUTPATIENT
Start: 2024-05-28 | End: 2024-05-28 | Stop reason: HOSPADM

## 2024-05-28 RX ORDER — POTASSIUM CHLORIDE 20 MEQ/1
40 TABLET, EXTENDED RELEASE ORAL ONCE
Qty: 2 TABLET | Refills: 0 | Status: DISCONTINUED | OUTPATIENT
Start: 2024-05-28 | End: 2024-05-28

## 2024-05-28 RX ORDER — LAMOTRIGINE 50 MG/1
50 TABLET, EXTENDED RELEASE ORAL NIGHTLY
COMMUNITY

## 2024-05-28 RX ORDER — FLUOXETINE 10 MG/1
10 CAPSULE ORAL DAILY
COMMUNITY

## 2024-05-28 RX ORDER — METOCLOPRAMIDE 5 MG/1
5 TABLET ORAL 3 TIMES DAILY PRN
Qty: 21 TABLET | Refills: 0 | Status: SHIPPED | OUTPATIENT
Start: 2024-05-28 | End: 2024-06-04

## 2024-05-28 RX ORDER — DIPHENHYDRAMINE HYDROCHLORIDE 25 MG/1
25 CAPSULE ORAL DAILY
Status: DISCONTINUED | OUTPATIENT
Start: 2024-05-28 | End: 2024-05-28 | Stop reason: HOSPADM

## 2024-05-28 RX ORDER — CYPROHEPTADINE HYDROCHLORIDE 4 MG/1
4 TABLET ORAL DAILY
COMMUNITY
End: 2024-05-28 | Stop reason: HOSPADM

## 2024-05-28 RX ORDER — METOCLOPRAMIDE HYDROCHLORIDE 5 MG/ML
5 INJECTION INTRAMUSCULAR; INTRAVENOUS ONCE
Status: COMPLETED | OUTPATIENT
Start: 2024-05-28 | End: 2024-05-28

## 2024-05-28 RX ORDER — POTASSIUM CHLORIDE 20 MEQ/1
40 TABLET, EXTENDED RELEASE ORAL EVERY 4 HOURS
Status: DISCONTINUED | OUTPATIENT
Start: 2024-05-28 | End: 2024-05-28 | Stop reason: HOSPADM

## 2024-05-28 RX ORDER — POTASSIUM CHLORIDE 20 MEQ/1
40 TABLET, EXTENDED RELEASE ORAL ONCE
Status: COMPLETED | OUTPATIENT
Start: 2024-05-28 | End: 2024-05-28

## 2024-05-28 RX ORDER — ONDANSETRON 2 MG/ML
4 INJECTION INTRAMUSCULAR; INTRAVENOUS EVERY 6 HOURS PRN
Status: DISCONTINUED | OUTPATIENT
Start: 2024-05-28 | End: 2024-05-28 | Stop reason: HOSPADM

## 2024-05-28 RX ADMIN — CEFTRIAXONE 1000 MG: 1 INJECTION, POWDER, FOR SOLUTION INTRAMUSCULAR; INTRAVENOUS at 04:15

## 2024-05-28 RX ADMIN — SODIUM CHLORIDE 1000 ML: 9 INJECTION, SOLUTION INTRAVENOUS at 02:22

## 2024-05-28 RX ADMIN — DOXYLAMINE SUCCINATE 25 MG: 25 TABLET ORAL at 03:11

## 2024-05-28 RX ADMIN — SODIUM CHLORIDE 100 ML/HR: 9 INJECTION, SOLUTION INTRAVENOUS at 04:15

## 2024-05-28 RX ADMIN — Medication 25 MG: at 03:11

## 2024-05-28 RX ADMIN — POTASSIUM CHLORIDE 40 MEQ: 1500 TABLET, EXTENDED RELEASE ORAL at 03:11

## 2024-05-28 RX ADMIN — METOCLOPRAMIDE 5 MG: 5 INJECTION, SOLUTION INTRAMUSCULAR; INTRAVENOUS at 03:35

## 2024-05-28 NOTE — SIGNIFICANT NOTE
05/28/24 1054   Living Situation   Current Living Arrangements home   Potentially Unsafe Housing Conditions none   Food Insecurity   Within the past 12 months, you worried that your food would run out before you got the money to buy more. Never true   Within the past 12 months, the food you bought just didn't last and you didn't have money to get more. Never true   Transportation Needs   In the past 12 months, has lack of transportation kept you from medical appointments or from getting medications? no   In the past 12 months, has lack of transportation kept you from meetings, work, or from getting things needed for daily living? No   Utilities   In the past 12 months has the electric, gas, oil, or water company threatened to shut off services in your home? No   Abuse Screen (yes response referral indicated)   Feels Unsafe at Home or Work/School no   Feels Threatened by Someone no   Does Anyone Try to Keep You From Having Contact with Others or Doing Things Outside Your Home? no   Physical Signs of Abuse Present no   Financial Resource Strain   How hard is it for you to pay for the very basics like food, housing, medical care, and heating? Not very   Employment   Do you want help finding or keeping work or a job? I do not need or want help   Family and Community Support   If for any reason you need help with day-to-day activities such as bathing, preparing meals, shopping, managing finances, etc., do you get the help you need? I get all the help I need   How often do you feel lonely or isolated from those around you? Rarely   Education   Preferred Language English   Do you want help with school or training? For example, starting or completing job training or getting a high school diploma, GED or equivalent No   Physical Activity   On average, how many days per week do you engage in moderate to strenuous exercise (like a brisk walk)? 0 days   On average, how many minutes do you engage in exercise at this level? 0  min   Number of minutes of exercise per week (!) 0   Alcohol Use   Q1: How often do you have a drink containing alcohol? Never   Q2: How many drinks containing alcohol do you have on a typical day when you are drinking? None   Q3: How often do you have six or more drinks on one occasion? Never   Stress   Do you feel stress - tense, restless, nervous, or anxious, or unable to sleep at night because your mind is troubled all the time - these days? Only a littl   Mental Health   Little Interest or Pleasure in Doing Things 0-->not at all   Feeling Down, Depressed or Hopeless 0-->not at all   Disabilities   Concentrating, Remembering or Making Decisions Difficulty no   Doing Errands Independently Difficulty (such as shopping) no

## 2024-05-28 NOTE — PAYOR COMM NOTE
"      =================  OBSERVATION NOTIFICATION:     PLEASE FAX OR CALL DETERMINATION TO CONTACT BELOW:       THANK YOU,    SAVANNAH Sheffield, RN  Utilization Review  [facility]  Phone: [phone]  Fax: [phone]      NPI: [id number]  Tax ID: [id number]      Madeleine Hook (29 y.o. Female)       Date of Birth   1995    Social Security Number       Address   [address] [place] [address]    Home Phone   [phone]    MRN   [id number]       Anabaptist   None    Marital Status                               Admission Date   24    Admission Type   Emergency    Admitting Provider   Benjamin Haq MD    Attending Provider       Department, Room/Bed   [facility], 1113/       Discharge Date   2024    Discharge Disposition   Home or Self Care    Discharge Destination                                 Attending Provider: (none)   Allergies: No Known Allergies    Isolation: None   Infection: None   Code Status: Prior    Ht: 160 cm (63\")   Wt: 53 kg (116 lb 13.5 oz)    Admission Cmt: None   Principal Problem: Syncope [R55]                   Active Insurance as of 2024       Primary Coverage       Payor Plan Insurance Group Employer/Plan Group    [facility]        Payor Plan Address Payor Plan Phone Number Payor Plan Fax Number Effective Dates    [address] [phone]  2021 - None Entered    [facility] [id number]         Subscriber Name Subscriber Birth Date Member ID       SUDHIR COLE 1995 [id number]                     Emergency Contacts        (Rel.) Home Phone Work Phone Mobile Phone    SUDHIR COLE (Spouse) [phone] -- [phone]                 History & Physical        Essing-Celeste Smith APRN at [address] [facility]              [facility] Medicine Services  History & Physical    Patient Name: Madeleine Hook  : 1995  MRN: [id number]  Primary Care Physician:  Provider, No " Known  Date of admission: 5/27/2024  Date and Time of Service: 5/28/24 at 0345    Subjective      Chief Complaint: syncope     History of Present Illness: Madeleine Hook is a  very pleasant 29 y.o. female with a CMH of anxiety followed by outpatient psychiatry, tobacco use and is currently 6 weeks pregnant who presented to [facility] on 5/27/2024 with multiple episodes of syncope.  He denies any injury or chest pain.  Last menstrual period was 4/1/2024.  This is her second pregnancy.  She has 1 child at home.  She reports a history of bipolar disorder and anxiety.  She reports she has been taking he Prozac and Lamictal that she asked her psychiatrist he said it was okay with pregnancy.  She has not been taking propranolol that she had taken in the past due to pregnancy.  She denies any vaginal bleeding or discharge abdominal pain or back pain no fevers cough congestion or headache.  Does report some increased anxiety.  Apparently in the emergency department she had an episode of hyperventilation and heart rate went up to 178 there was initial consideration of giving adenosine but this resolved without intervention.  She Also reports some nausea and vomiting difficulty keeping down p.o. intake since Friday.  He denies any abdominal pain or hematemesis.  She reports she had nausea and vomiting with her last pregnancy.  Labs today show sodium 135 potassium 3.2 chloride 97 CO2 19.6 glucose 104 anion gap 18.4 hCG quantitative 34,299 CBC is normal.  Urinalysis shows trace leukocytes negative nitrates 6-10 WBCs trace bacteria 7-12 epithelial cells.  Denies any increase frequency of urination or dysuria.  EKG shows normal sinus rhythm heart rate 90.  His EKG showed heart rate 97 normal no acute ST elevation or depression.  She was given a 1 L fluid bolus in the emergency department potassium replacement, doxylamine and Reglan.  She be admitted for IV fluid hydration and use cardiac monitoring.  2D echo ordered  given syncope and SVT episode.  OB/GYN consulted.      Review of Systems   Constitutional: Negative.    HENT: Negative.     Eyes: Negative.    Respiratory: Negative.     Cardiovascular: Negative.    Gastrointestinal:  Positive for nausea and vomiting.   Endocrine: Negative.    Genitourinary: Negative.    Musculoskeletal: Negative.    Skin: Negative.    Allergic/Immunologic: Negative.    Neurological:  Positive for syncope.   Hematological: Negative.    Psychiatric/Behavioral:  The patient is nervous/anxious.    All other systems reviewed and are negative.      Personal History     Past Medical History:   Diagnosis Date    Anxiety        Past Surgical History:   Procedure Laterality Date    WISDOM TOOTH EXTRACTION         Family History: family history is not on file. Otherwise pertinent FHx was reviewed and not pertinent to current issue.    Social History:  reports that she has been smoking cigarettes. She has a 2 pack-year smoking history. She does not have any smokeless tobacco history on file. She reports that she does not drink alcohol and does not use drugs.    Home Medications:  Prior to Admission Medications       Prescriptions Last Dose Informant Patient Reported? Taking?    ibuprofen (ADVIL,MOTRIN) 600 MG tablet   No No    Take 1 tablet by mouth Every 6 (Six) Hours.    metoclopramide (REGLAN) 10 MG tablet   No No    Take 1 tablet by mouth 3 (Three) Times a Day As Needed (nausea).    sertraline (ZOLOFT) 100 MG tablet   Yes No    Take 100 mg by mouth Daily.              Allergies:  No Known Allergies    Objective      Vitals:   Temp:  [98.6 °F (37 °C)] 98.6 °F (37 °C)  Heart Rate:  [] 93  Resp:  [16-37] 17  BP: (116-140)/(73-90) 130/89  Body mass index is 20.7 kg/m².  Physical Exam  Vitals reviewed.   Constitutional:       Appearance: Normal appearance. She is normal weight.   HENT:      Head: Normocephalic and atraumatic.      Right Ear: External ear normal.      Left Ear: External ear normal.       Nose: Nose normal.      Mouth/Throat:      Mouth: Mucous membranes are moist.   Eyes:      Extraocular Movements: Extraocular movements intact.   Cardiovascular:      Rate and Rhythm: Normal rate and regular rhythm.      Pulses: Normal pulses.      Heart sounds: Normal heart sounds.   Pulmonary:      Effort: Pulmonary effort is normal.      Breath sounds: Normal breath sounds.   Abdominal:      Palpations: Abdomen is soft.   Genitourinary:     Comments: deferred  Musculoskeletal:         General: Normal range of motion.      Cervical back: Normal range of motion and neck supple.   Skin:     General: Skin is warm and dry.   Neurological:      General: No focal deficit present.      Mental Status: She is alert and oriented to person, place, and time.   Psychiatric:         Mood and Affect: Mood normal.         Behavior: Behavior normal.         Thought Content: Thought content normal.         Judgment: Judgment normal.         Diagnostic Data:  Lab Results (last 24 hours)       Procedure Component Value Units Date/Time    Urine Culture - Urine, Urine, Clean Catch [[id number]] Collected: 05/28/24 0121    Specimen: Urine, Clean Catch Updated: 05/28/24 0345    Urinalysis With Microscopic If Indicated (No Culture) - Urine, Clean Catch [[id number]]  (Abnormal) Collected: 05/28/24 0121    Specimen: Urine, Clean Catch Updated: 05/28/24 0135     Color, UA Yellow     Appearance, UA Slightly Cloudy     pH, UA 6.0     Specific Gravity, UA 1.027     Glucose, UA Negative     Ketones, UA >=160 mg/dL (4+)     Bilirubin, UA Negative     Blood, UA Small (1+)     Protein, UA 30 mg/dL (1+)     Leuk Esterase, UA Trace     Nitrite, UA Negative     Urobilinogen, UA 1.0 E.U./dL    Urinalysis, Microscopic Only - Urine, Clean Catch [[id number]]  (Abnormal) Collected: 05/28/24 0121    Specimen: Urine, Clean Catch Updated: 05/28/24 0135     RBC, UA 6-10 /HPF      WBC, UA 6-10 /HPF      Bacteria, UA Trace /HPF      Squamous Epithelial Cells, UA  7-12 /HPF      Hyaline Casts, UA 3-6 /LPF      Methodology Automated Microscopy    hCG, Quantitative, Pregnancy [[id number]] Collected: 05/27/24 2214    Specimen: Blood Updated: 05/27/24 2257     HCG Quantitative 34,299.00 mIU/mL     Narrative:      HCG Ranges by Gestational Age    Females - non-pregnant premenopausal   </= 1mIU/mL HCG  Females - postmenopausal               </= 7mIU/mL HCG    3 Weeks       5.4   -      72 mIU/mL  4 Weeks      10.2   -     708 mIU/mL  5 Weeks       217   -   8,245 mIU/mL  6 Weeks       152   -  32,177 mIU/mL  7 Weeks     4,059   - 153,767 mIU/mL  8 Weeks    31,366   - 149,094 mIU/mL  9 Weeks    59,109   - 135,901 mIU/mL  10 Weeks   44,186   - 170,409 mIU/mL  12 Weeks   27,107   - 201,615 mIU/mL  14 Weeks   24,302   -  93,646 mIU/mL  15 Weeks   12,540   -  69,747 mIU/mL  16 Weeks    8,904   -  55,332 mIU/mL  17 Weeks    8,240   -  51,793 mIU/mL  18 Weeks    9,649   -  55,271 mIU/mL      Comprehensive Metabolic Panel [[id number]]  (Abnormal) Collected: 05/27/24 2214    Specimen: Blood Updated: 05/27/24 2250     Glucose 104 mg/dL      BUN 9 mg/dL      Creatinine 0.64 mg/dL      Sodium 135 mmol/L      Potassium 3.2 mmol/L      Comment: Slight hemolysis detected by analyzer. Result may be falsely elevated.        Chloride 97 mmol/L      CO2 19.6 mmol/L      Calcium 10.3 mg/dL      Total Protein 8.5 g/dL      Albumin 5.3 g/dL      ALT (SGPT) 8 U/L      AST (SGOT) 24 U/L      Alkaline Phosphatase 78 U/L      Total Bilirubin 0.8 mg/dL      Globulin 3.2 gm/dL      A/G Ratio 1.7 g/dL      BUN/Creatinine Ratio 14.1     Anion Gap 18.4 mmol/L      eGFR 124.4 mL/min/1.73     Narrative:      GFR Normal >60  Chronic Kidney Disease <60  Kidney Failure <15      Gill Draw [[id number]] Collected: 05/27/24 2214    Specimen: Blood Updated: 05/27/24 4313    Narrative:      The following orders were created for panel order Gill Draw.  Procedure                               Abnormality         Status                      ---------                               -----------         ------                     Green Top (Gel)[[id number]]                                  Final result               Lavender Top[[id number]]                                     Final result               Gold Top - SST[[id number]]                                   Final result               Light Blue Top[[id number]]                                   Final result                 Please view results for these tests on the individual orders.    Green Top (Gel) [[id number]] Collected: 05/27/24 2214    Specimen: Blood Updated: 05/27/24 2232     Extra Tube Hold for add-ons.     Comment: Auto resulted.       Lavender Top [[id number]] Collected: 05/27/24 2214    Specimen: Blood Updated: 05/27/24 2232     Extra Tube hold for add-on     Comment: Auto resulted       Gold Top - SST [[id number]] Collected: 05/27/24 2214    Specimen: Blood Updated: 05/27/24 2232     Extra Tube Hold for add-ons.     Comment: Auto resulted.       Light Blue Top [[id number]] Collected: 05/27/24 2214    Specimen: Blood Updated: 05/27/24 2232     Extra Tube Hold for add-ons.     Comment: Auto resulted       CBC & Differential [[id number]]  (Abnormal) Collected: 05/27/24 2214    Specimen: Blood Updated: 05/27/24 2223    Narrative:      The following orders were created for panel order CBC & Differential.  Procedure                               Abnormality         Status                     ---------                               -----------         ------                     CBC Auto Differential[[id number]]        Abnormal            Final result                 Please view results for these tests on the individual orders.    CBC Auto Differential [[id number]]  (Abnormal) Collected: 05/27/24 2214    Specimen: Blood Updated: 05/27/24 2223     WBC 10.35 10*3/mm3      RBC 4.89 10*6/mm3      Hemoglobin 14.5 g/dL      Hematocrit 41.4 %      MCV 84.7 fL      MCH 29.7 pg      MCHC 35.0 g/dL       RDW 12.7 %      RDW-SD 38.5 fl      MPV 11.0 fL      Platelets 287 10*3/mm3      Neutrophil % 69.6 %      Lymphocyte % 20.7 %      Monocyte % 9.0 %      Eosinophil % 0.1 %      Basophil % 0.3 %      Immature Grans % 0.3 %      Neutrophils, Absolute 7.21 10*3/mm3      Lymphocytes, Absolute 2.14 10*3/mm3      Monocytes, Absolute 0.93 10*3/mm3      Eosinophils, Absolute 0.01 10*3/mm3      Basophils, Absolute 0.03 10*3/mm3      Immature Grans, Absolute 0.03 10*3/mm3      nRBC 0.0 /100 WBC              Imaging Results (Last 24 Hours)       Procedure Component Value Units Date/Time    US Ob Transvaginal [[id number]] Collected: 05/28/24 0100     Updated: 05/28/24 0104    Narrative:      US OB < 14 WEEKS SINGLE OR FIRST GESTATION  US OB TRANSVAGINAL    Date of Exam: 5/28/2024 12:36 AM EDT    Indication: pregnany, syncope..    Comparison: No comparisons available.    Technique: Grayscale and color Doppler transabdominal and transvaginal ultrasound was performed for maternal and fetal evaluation of a first trimester single gestation.  Images were obtained per protocol.      Findings:  The uterus measures 8.6 x 3.4 x 6.4 cm. A gestational sac is noted in the fundal endometrial canal. Yolk sac and fetal pole identified. Fetal heart rate is 111 bpm. The estimated gestational age is 6 weeks 1 day. No perigestational hemorrhage.     The right ovary measures 2.8 x 2.4 x 2.5 cm and the left ovary measures 2.1 x 2.0 x 1.9 cm. Ovarian vascularity appears intact.  There is no evidence of a solid ovarian mass.    There is no significant free fluid identified within the pelvis.      Impression:      Impression:  Solitary viable intrauterine pregnancy without complicating features.        Electronically Signed: Preston Wagner MD    5/28/2024 1:02 AM EDT    Workstation ID: [id number]    US Ob < 14 Weeks Single or First Gestation [[id number]] Collected: 05/28/24 0100     Updated: 05/28/24 0104    Narrative:      US OB < 14 WEEKS SINGLE OR  FIRST GESTATION  US OB TRANSVAGINAL    Date of Exam: 5/28/2024 12:36 AM EDT    Indication: pregnany, syncope..    Comparison: No comparisons available.    Technique: Grayscale and color Doppler transabdominal and transvaginal ultrasound was performed for maternal and fetal evaluation of a first trimester single gestation.  Images were obtained per protocol.      Findings:  The uterus measures 8.6 x 3.4 x 6.4 cm. A gestational sac is noted in the fundal endometrial canal. Yolk sac and fetal pole identified. Fetal heart rate is 111 bpm. The estimated gestational age is 6 weeks 1 day. No perigestational hemorrhage.     The right ovary measures 2.8 x 2.4 x 2.5 cm and the left ovary measures 2.1 x 2.0 x 1.9 cm. Ovarian vascularity appears intact.  There is no evidence of a solid ovarian mass.    There is no significant free fluid identified within the pelvis.      Impression:      Impression:  Solitary viable intrauterine pregnancy without complicating features.        Electronically Signed: Preston Wagner MD    5/28/2024 1:02 AM EDT    Workstation ID: [id number]              Assessment & Plan        This is a 29 y.o. female with:    Active and Resolved Problems  Active Hospital Problems    Diagnosis  POA    **Syncope [R55]  Yes     Priority: High    Pregnancy [Z34.90]  Not Applicable     Priority: Medium    Nausea and vomiting [R11.2]  Yes     Priority: Medium    Tobacco use [Z72.0]  Yes    Anxiety during pregnancy [O99.340, F41.9]  Yes      Resolved Hospital Problems   No resolved problems to display.     Syncope, may be pregnancy or dehydration related but cannot rule out underlying cardiac etiology, 2D echo in a.m., continuous cardiac monitoring, consider cardiology consult if indicated fall precautions    Pregnancy 6 weeks gestation, viable per ultrasound report, OB/GYN consulted    Nausea and vomiting without abdominal pain may be secondary to pregnancy, 4 mg IV Zofran every 6 hours as needed, IV fluid  hydration    Tobacco use, reports vapes encourage cessation     Anxiety during pregnancy, sees outpatient psychiatry, reports has been taking home Lamictal and Prozac, defer to OB/GYN for safety, has not been taking home propranolol due to pregnancy.      DVT prophylaxis:  Mechanical DVT prophylaxis orders are present.        The patient desires to be as follows:    CODE STATUS:    Code Status (Patient has no pulse and is not breathing): CPR (Attempt to Resuscitate)  Medical Interventions (Patient has pulse or is breathing): Full Support            Admission Status:  I believe this patient meets observation status.    Expected Length of Stay: I day     PDMP and Medication Dispenses via Sidebar reviewed and consistent with patient reported medications.    I discussed the patient's findings and my recommendations with patient and family.      Signature:     This document has been electronically signed by SHAE Maier on May 28, 2024 04:56 EDT   [facility] Hospitalist Team    Electronically signed by Celeste Ibarra APRN at 05/28/24 0505          Emergency Department Notes        Jacquelyn Ponce RN at 05/27/24 2231          Pts heart rate jumped up in the 170's. Provider notified. Provider came in room to assess pt. Provider stated to get adenosine and obtain a 12 lead EKG. When arriving back to room, pts heart rate had went down. Provider stated to hold adenosine    Electronically signed by Jacquelyn Ponce RN at 05/27/24 2233       Tatiana Zaldivar APRN at 05/27/24 2220          Subjective   Chief Complaint   Patient presents with    Syncope       History of Present Illness  Patient is a 29-year-old female presents emergency department with complaint of multiple episodes of syncope.  She reports that she has been having multiple episodes of nausea and vomiting.  Unable to tolerate oral food and fluids,  patient does report she had a positive pregnancy test at home.  Her last  menstrual period was 4/1/2024.  This is her second pregnancy.  She has 1 child at home.  No pregnancy complications previously.  Does report a history of bipolar, she has a history of anxiety.  She does report she typically takes propranolol for her anxiety but was awaiting her provider to let her know that it was appropriate for pregnancy.  Denies any vaginal bleeding or discharge.  No fevers.  Patient denies any severe pelvic pain or abdominal pain.  She is not having any chest pain or shortness of breath.  Does not have any headaches.  She had no head injury with her syncopal episodes.  No visual disturbances, speech disturbances, unilateral weakness, numbness or tingling.  Review of Systems   Constitutional:  Negative for chills and fever.   Eyes:  Negative for photophobia and visual disturbance.   Respiratory:  Negative for shortness of breath.    Cardiovascular:  Positive for palpitations. Negative for chest pain and leg swelling.   Gastrointestinal:  Positive for nausea and vomiting. Negative for abdominal pain and diarrhea.   Genitourinary:  Negative for difficulty urinating, dysuria, pelvic pain, vaginal bleeding and vaginal discharge.   Musculoskeletal:  Negative for back pain, neck pain and neck stiffness.   Skin:  Negative for color change and rash.   Neurological:  Positive for dizziness, syncope and light-headedness. Negative for tremors, seizures, facial asymmetry, speech difficulty, weakness, numbness and headaches.   Psychiatric/Behavioral:  Negative for confusion. The patient is nervous/anxious.        Past Medical History:   Diagnosis Date    Anxiety        No Known Allergies    Past Surgical History:   Procedure Laterality Date    WISDOM TOOTH EXTRACTION         History reviewed. No pertinent family history.    Social History     Socioeconomic History    Marital status:    Tobacco Use    Smoking status: Every Day     Current packs/day: 0.50     Average packs/day: 0.5 packs/day for 4.0  years (2.0 ttl pk-yrs)     Types: Cigarettes   Substance and Sexual Activity    Alcohol use: Never    Drug use: Never           Objective   Physical Exam  Vitals and nursing note reviewed.   Constitutional:       General: She is in acute distress.      Appearance: She is diaphoretic.      Comments: Tachypneic, tearful   HENT:      Head: Normocephalic and atraumatic.      Nose: Nose normal.      Mouth/Throat:      Mouth: Mucous membranes are moist.      Pharynx: Oropharynx is clear.   Eyes:      Extraocular Movements: Extraocular movements intact.      Conjunctiva/sclera: Conjunctivae normal.      Pupils: Pupils are equal, round, and reactive to light.   Cardiovascular:      Rate and Rhythm: Regular rhythm. Tachycardia present.      Heart sounds: Normal heart sounds. No murmur heard.     No friction rub. No gallop.   Pulmonary:      Effort: Pulmonary effort is normal.      Breath sounds: Normal breath sounds.   Abdominal:      General: Bowel sounds are normal.      Palpations: Abdomen is soft.   Musculoskeletal:         General: Normal range of motion.      Cervical back: Normal range of motion and neck supple. No rigidity.      Right lower leg: No edema.      Left lower leg: No edema.      Comments: Patient noted to have carpopedal spasm   Skin:     General: Skin is warm and dry.      Capillary Refill: Capillary refill takes less than 2 seconds.   Neurological:      General: No focal deficit present.      Mental Status: She is alert and oriented to person, place, and time.   Psychiatric:         Mood and Affect: Mood is anxious. Affect is tearful.         Behavior: Behavior is cooperative.         Procedures          ED Course  ED Course as of 05/28/24 0537   Mon May 27, 2024   8715 I was asked by nursing to evaluate the patient.  Heart rate noted to be in the 180s.  Patient hyperventilating, has carpopedal spasms.  In speaking to the patient, she did decrease her breathing, her heart rate has slowed into the 120s.   "Will proceed with syncope workup [LB]   2224 Pt .   She did have a brief syncopal episode per nursing.   Upon my entering the room the patient is talking, appropriate neurologically.  [LB]   Tue May 28, 2024   0155 Patient appears resting comfortably, no acute distress.  Heart rate much improved [LB]   0334 Temp: 98.6 °F (37 °C) [LB]   0350 Consult with dr. Morales, ob/gyn [LB]      ED Course User Index  [LB] Tatiana Zaldivar Kimberly, SHAE      /89   Pulse 93   Temp 98.6 °F (37 °C) (Oral)   Resp 17   Ht 160 cm (63\")   Wt 53 kg (116 lb 13.5 oz)   LMP 04/11/2024 (Exact Date)   SpO2 100%   BMI 20.70 kg/m²   Medications   sodium chloride 0.9 % flush 10 mL (has no administration in time range)   sodium chloride 0.9 % flush 10 mL (has no administration in time range)   vitamin B-6 (PYRIDOXINE) tablet 25 mg (25 mg Oral Given 5/28/24 0311)   sodium chloride 0.9 % infusion (100 mL/hr Intravenous New Bag 5/28/24 0415)   Potassium Replacement - Follow Nurse / BPA Driven Protocol (has no administration in time range)   ondansetron (ZOFRAN) injection 4 mg (has no administration in time range)   sodium chloride 0.9 % bolus 1,000 mL (0 mL Intravenous Stopped 5/28/24 0415)   potassium chloride (KLOR-CON M20) CR tablet 40 mEq (40 mEq Oral Given 5/28/24 0311)   doxylamine (UNISOM) tablet 25 mg (25 mg Oral Given 5/28/24 0311)   metoclopramide (REGLAN) injection 5 mg (5 mg Intravenous Given 5/28/24 0335)   cefTRIAXone (ROCEPHIN) 1,000 mg in sodium chloride 0.9 % 100 mL MBP (1,000 mg Intravenous New Bag 5/28/24 0415)     US Ob Transvaginal    Result Date: 5/28/2024  Impression: Solitary viable intrauterine pregnancy without complicating features. Electronically Signed: Preston Wagner MD  5/28/2024 1:02 AM EDT  Workstation ID: [id number]    US Ob < 14 Weeks Single or First Gestation    Result Date: 5/28/2024  Impression: Solitary viable intrauterine pregnancy without complicating features. Electronically Signed: Preston" MD Kristy  5/28/2024 1:02 AM EDT  Workstation ID: [id number]   Lab Results (last 24 hours)       Procedure Component Value Units Date/Time    CBC & Differential [[id number]]  (Abnormal) Collected: 05/27/24 2214    Specimen: Blood Updated: 05/27/24 2223    Narrative:      The following orders were created for panel order CBC & Differential.  Procedure                               Abnormality         Status                     ---------                               -----------         ------                     CBC Auto Differential[[id number]]        Abnormal            Final result                 Please view results for these tests on the individual orders.    Comprehensive Metabolic Panel [[id number]]  (Abnormal) Collected: 05/27/24 2214    Specimen: Blood Updated: 05/27/24 2250     Glucose 104 mg/dL      BUN 9 mg/dL      Creatinine 0.64 mg/dL      Sodium 135 mmol/L      Potassium 3.2 mmol/L      Comment: Slight hemolysis detected by analyzer. Result may be falsely elevated.        Chloride 97 mmol/L      CO2 19.6 mmol/L      Calcium 10.3 mg/dL      Total Protein 8.5 g/dL      Albumin 5.3 g/dL      ALT (SGPT) 8 U/L      AST (SGOT) 24 U/L      Alkaline Phosphatase 78 U/L      Total Bilirubin 0.8 mg/dL      Globulin 3.2 gm/dL      A/G Ratio 1.7 g/dL      BUN/Creatinine Ratio 14.1     Anion Gap 18.4 mmol/L      eGFR 124.4 mL/min/1.73     Narrative:      GFR Normal >60  Chronic Kidney Disease <60  Kidney Failure <15      hCG, Quantitative, Pregnancy [[id number]] Collected: 05/27/24 2214    Specimen: Blood Updated: 05/27/24 2257     HCG Quantitative 34,299.00 mIU/mL     Narrative:      HCG Ranges by Gestational Age    Females - non-pregnant premenopausal   </= 1mIU/mL HCG  Females - postmenopausal               </= 7mIU/mL HCG    3 Weeks       5.4   -      72 mIU/mL  4 Weeks      10.2   -     708 mIU/mL  5 Weeks       217   -   8,245 mIU/mL  6 Weeks       152   -  32,177 mIU/mL  7 Weeks     4,059   - 153,767 mIU/mL  8  Weeks    31,366   - 149,094 mIU/mL  9 Weeks    59,109   - 135,901 mIU/mL  10 Weeks   44,186   - 170,409 mIU/mL  12 Weeks   27,107   - 201,615 mIU/mL  14 Weeks   24,302   -  93,646 mIU/mL  15 Weeks   12,540   -  69,747 mIU/mL  16 Weeks    8,904   -  55,332 mIU/mL  17 Weeks    8,240   -  51,793 mIU/mL  18 Weeks    9,649   -  55,271 mIU/mL      CBC Auto Differential [[id number]]  (Abnormal) Collected: 05/27/24 2214    Specimen: Blood Updated: 05/27/24 2223     WBC 10.35 10*3/mm3      RBC 4.89 10*6/mm3      Hemoglobin 14.5 g/dL      Hematocrit 41.4 %      MCV 84.7 fL      MCH 29.7 pg      MCHC 35.0 g/dL      RDW 12.7 %      RDW-SD 38.5 fl      MPV 11.0 fL      Platelets 287 10*3/mm3      Neutrophil % 69.6 %      Lymphocyte % 20.7 %      Monocyte % 9.0 %      Eosinophil % 0.1 %      Basophil % 0.3 %      Immature Grans % 0.3 %      Neutrophils, Absolute 7.21 10*3/mm3      Lymphocytes, Absolute 2.14 10*3/mm3      Monocytes, Absolute 0.93 10*3/mm3      Eosinophils, Absolute 0.01 10*3/mm3      Basophils, Absolute 0.03 10*3/mm3      Immature Grans, Absolute 0.03 10*3/mm3      nRBC 0.0 /100 WBC     Urinalysis With Microscopic If Indicated (No Culture) - Urine, Clean Catch [[id number]]  (Abnormal) Collected: 05/28/24 0121    Specimen: Urine, Clean Catch Updated: 05/28/24 0135     Color, UA Yellow     Appearance, UA Slightly Cloudy     pH, UA 6.0     Specific Gravity, UA 1.027     Glucose, UA Negative     Ketones, UA >=160 mg/dL (4+)     Bilirubin, UA Negative     Blood, UA Small (1+)     Protein, UA 30 mg/dL (1+)     Leuk Esterase, UA Trace     Nitrite, UA Negative     Urobilinogen, UA 1.0 E.U./dL    Urinalysis, Microscopic Only - Urine, Clean Catch [[id number]]  (Abnormal) Collected: 05/28/24 0121    Specimen: Urine, Clean Catch Updated: 05/28/24 0135     RBC, UA 6-10 /HPF      WBC, UA 6-10 /HPF      Bacteria, UA Trace /HPF      Squamous Epithelial Cells, UA 7-12 /HPF      Hyaline Casts, UA 3-6 /LPF      Methodology Automated  Microscopy    Urine Culture - Urine, Urine, Clean Catch [[id number]] Collected: 05/28/24 0121    Specimen: Urine, Clean Catch Updated: 05/28/24 1061                                                 Medical Decision Making  Problems Addressed:  Dehydration: complicated acute illness or injury  Less than 8 weeks gestation of pregnancy: complicated acute illness or injury  Nausea and vomiting, unspecified vomiting type: complicated acute illness or injury  Syncope, unspecified syncope type: complicated acute illness or injury    Amount and/or Complexity of Data Reviewed  Labs: ordered.  Radiology: ordered.  ECG/medicine tests: ordered.    Risk  OTC drugs.  Prescription drug management.  Decision regarding hospitalization.      EKG interpreted independently per ED attending physican-initial EKG sinus rhythm rate of 97.  No acute ST changes.  Repeat EKG attempted during patient's tachycardic episode, however she had already converted back to sinus rhythm with a rate of 90 no acute ST changes.  She is no previous available.  Pt was Placed on appropriate monitoring.  Differential diagnoses considered for patient presentation, this list is not all inclusive of diagnoses considered: Electrolyte imbalance, arrhythmia, hyperventilation, ectopic pregnancy  Patient presents to the ED for the above complaint, underwent the above, exam and workup. Upon initial exam of the patient, I was asked to come to the room by nursing staff her heart rate was 178.  Patient noted to be hyperventilating, with carpopedal spasm.  Initially considered giving adenosine, in speaking to the patient, she was able to slow her breathing down, and her heart rate has decreased, without intervention from adenosine.  Hemoglobin hematocrit and platelets are normal.  Platelet cell count is normal.  Potassium decreased at 3.2.  CO2 was noted be 19, her anion gap 18.4 she was noted to be hyperventilating upon initial exam.  Quantitative hCG 34,299.  UA concerning  for infection, as well as dehydration.  She is trace bacteria, 6- white blood cells.  She has greater than 160 ketones.  She was hydrated, given Unisom and B12 for nausea, was actually improving however after her p.o. challenge and attempted p.o. medication she vomited.  She has not had any recurrence of her tachycardia or syncopal episodes.  She is awake alert and oriented, her abdominal exam is benign.  Grossly normal neurological exam..  Patient will be admitted for further evaluation and management.  I consulted with hospitalist for admission given her syncopal episodes, nausea, vomiting, dehydration.  They did request a consult from OB/GYN spoke with OB/GYN agrees with medicine consultation  Disposition: I discussed with the patient their test results, work-up here in the emergency department, and need for admission and further evaluation. Patient is agreeable to the plan of care. At time of disposition patient's VS are reviewed, and patient without acute distress.  Opportunity was provided for questions at the bedside, all questions and concerns were addressed.  Note Disclaimer: At [facility], we believe that sharing information builds trust and better relationships. You are receiving this note because you recently visited [facility]. It is possible you will see health information before a provider has talked with you about it. This kind of information can be easy to misunderstand. To help you fully understand what it means for your health, we urge you to discuss this note with your provider  Note dictated utilizing Dragon Dictation.  Appropriate PPE worn during patient interactions.        Final diagnoses:   Syncope, unspecified syncope type   Less than 8 weeks gestation of pregnancy   Nausea and vomiting, unspecified vomiting type   Dehydration       ED Disposition  ED Disposition       ED Disposition   Decision to Admit    Condition   --    Comment   Level of Care: Telemetry [5]   Admitting  Physician: JOANNA CHAWLA [[id number]]   Attending Physician: JOANNA CHAWLA [[id number]]                 No follow-up provider specified.       Medication List      No changes were made to your prescriptions during this visit.            Tatiana Zaldivar APRN  24 0542      Electronically signed by Tatiana Zaldivar APRN at 24 0542       Jacquelyn Ponce RN at 24 [phone]          Pt has had 3 episodes of passing out today. She passed out once this morning, once at 1630, and once while in the car on the way here. Pt lost consciousness all three times. At 1630, pt had passed out on the bathroom floor and hit the back of her head. Pt has been nauseated since Friday and has not been able to keep anything down today. Pt had a positive pregnancy test today. Pt has not taken her mood stabilizer and anxiety meds today due to her having a positive pregnancy test.     Electronically signed by Jacquelyn Ponce RN at 24 [phone]          Discharge Summary        Greg Medina MD at 24 [facility]5                       [facility] Medicine Services  Discharge Summary    Date of Service: 2024  Patient Name: Madeleine Hook  : 1995  MRN: [id number]    Date of Admission: 2024  Discharge Diagnosis:     -Syncope, likely vasovagal  -Nausea and vomiting  -Anxiety  -Pregnancy    Date of Discharge:  2024  Primary Care Physician: Provider, No Known      Presenting Problem:   Dehydration [E86.0]  Syncope [R55]  Less than 8 weeks gestation of pregnancy [Z3A.01]  Syncope, unspecified syncope type [R55]  Nausea and vomiting, unspecified vomiting type [R11.2]    Active and Resolved Hospital Problems:  Active Hospital Problems    Diagnosis POA    **Syncope [R55] Yes    Pregnancy [Z34.90] Not Applicable    Nausea and vomiting [R11.2] Yes    Tobacco use [Z72.0] Yes    Anxiety during pregnancy [O99.340, F41.9] Yes      Resolved Hospital Problems   No resolved problems to display.         Hospital  Course     Brief HPI and Hospital Course:    Pleasant 29-year-old patient previous history of anxiety, does follow-up with psychiatry and does use Prozac and Lamictal, presented to the hospital after the patient had few episode of syncope that happened after vomiting where the patient felt extremely weak lose energy, denies having any headache no palpitations no other specific complaints or symptoms just proceeding the patient passing out,  who was at the bedside did not report any tonic-clonic movement, patient did have decreased oral intake secondary to the nausea vomiting that she has been experiencing secondary to her pregnancy which she had during her previous one.     -Physical exam was normal EKG did not show any ischemic changes no abnormality telemetry monitor remained within sinus rhythm, transvaginal ultrasound showed viable pregnancy, admitting physician was planning on doing echocardiogram however the patient does not have any current artery disease and her clinical story is likely consistent with vasovagal incident for which I do not believe the patient needs it especially that I was notified that it will not even be done today and possibly not even tomorrow    -Patient did report that she did get a dose of Reglan yesterday which helped her, it was the only medication that did actually help with nausea vomiting during her previous pregnancy, will give patient 1 week supplement of Reglan 5 mg 3 times daily as needed, recommend the patient to call and get appointment to be established with OB/GYN for pregnancy care        DISCHARGE Follow Up Recommendations for labs and diagnostics:       Reasons For Change In Medications and Indications for New Medications:      Day of Discharge     Vital Signs:  Temp:  [98.1 °F (36.7 °C)-98.6 °F (37 °C)] 98.1 °F (36.7 °C)  Heart Rate:  [] 102  Resp:  [16-37] 22  BP: (115-140)/(73-90) 123/81    Physical Exam:    General Appearance:    Alert, cooperative, in  no acute distress   Head:    Normocephalic, without obvious abnormality, atraumatic   Eyes:            conjunctivae and sclerae normal, no   icterus, no pallor, corneas  clear, PERRLA   Neck:   No adenopathy, supple, trachea midline, no thyromegaly, no   carotid bruit, no JVD   Lungs:     Clear to auscultation,respirations regular, even and                  unlabored    Heart:    Regular rhythm and normal rate, normal S1 and S2, no            murmur, no gallop, no rub, no click   Abdomen:     Normal bowel sounds, no masses, no organomegaly, soft        non-tender, non-distended, no guarding, no rebound                No tenderness   Extremities:   Moves all extremities well, no edema, no cyanosis, no             redness   Lymph nodes:   No palpable adenopathy   Neurologic:   Cranial nerves 2 - 12 grossly intact, sensation intact, DTR       present and equal bilaterally          Pertinent  and/or Most Recent Results     LAB RESULTS:      Lab 05/27/24  2214   WBC 10.35   HEMOGLOBIN 14.5   HEMATOCRIT 41.4   PLATELETS 287   NEUTROS ABS 7.21*   IMMATURE GRANS (ABS) 0.03   LYMPHS ABS 2.14   MONOS ABS 0.93*   EOS ABS 0.01   MCV 84.7         Lab 05/28/24  0551 05/27/24  2214   SODIUM 137 135*   POTASSIUM 3.2* 3.2*   CHLORIDE 104 97*   CO2 18.2* 19.6*   ANION GAP 14.8 18.4*   BUN 7 9   CREATININE 0.48* 0.64   EGFR 133.3 124.4   GLUCOSE 99 104*   CALCIUM 8.9 10.3   MAGNESIUM 2.0  --          Lab 05/27/24  2214   TOTAL PROTEIN 8.5   ALBUMIN 5.3*   GLOBULIN 3.2   ALT (SGPT) 8   AST (SGOT) 24   BILIRUBIN 0.8   ALK PHOS 78                     Brief Urine Lab Results  (Last result in the past 365 days)        Color   Clarity   Blood   Leuk Est   Nitrite   Protein   CREAT   Urine HCG        05/28/24 0121 Yellow   Slightly Cloudy   Small (1+)   Trace   Negative   30 mg/dL (1+)                 Microbiology Results (last 10 days)       ** No results found for the last 240 hours. **            US Ob Transvaginal    Result Date:  5/28/2024  Impression: Impression: Solitary viable intrauterine pregnancy without complicating features. Electronically Signed: Preston Wagner MD  5/28/2024 1:02 AM EDT  Workstation ID: [id number]    US Ob < 14 Weeks Single or First Gestation    Result Date: 5/28/2024  Impression: Impression: Solitary viable intrauterine pregnancy without complicating features. Electronically Signed: Preston Wagner MD  5/28/2024 1:02 AM EDT  Workstation ID: [id number]                 Labs Pending at Discharge:  Pending Labs       Order Current Status    Urine Culture - Urine, Urine, Clean Catch In process            Procedures Performed           Consults:   Consults       Date and Time Order Name Status Description    5/28/2024  3:40 AM Inpatient Obstetrics / Gynecology Consult      5/28/2024  3:26 AM Hospitalist (on-call MD unless specified)                Discharge Details        Discharge Medications        New Medications        Instructions Start Date   metoclopramide 5 MG tablet  Commonly known as: Reglan   5 mg, Oral, 3 Times Daily PRN             Continue These Medications        Instructions Start Date   FLUoxetine 10 MG capsule  Commonly known as: PROzac   10 mg, Oral, Daily      LaMICtal XR 50 MG tablet sustained-release 24 hour  Generic drug: lamoTRIgine ER   50 mg, Oral, Nightly             Stop These Medications      cyproheptadine 4 MG tablet  Commonly known as: PERIACTIN              No Known Allergies      Discharge Disposition:   Home or Self Care    Diet:  Hospital:  Diet Order   Procedures    Diet: Regular/House; Fluid Consistency: Thin (IDDSI 0)         Discharge Activity:         CODE STATUS:  Code Status and Medical Interventions:   Ordered at: 05/28/24 0341     Code Status (Patient has no pulse and is not breathing):    CPR (Attempt to Resuscitate)     Medical Interventions (Patient has pulse or is breathing):    Full Support         No future appointments.        Time spent on Discharge including face to face  service:  >30 minutes    Signature: Electronically signed by Jazz Almonte MD, 05/28/24, 11:46 EDT.  [facility] Hospitalist Team      Electronically signed by Jazz Almonte MD at 05/28/24 1150       Discharge Order (From admission, onward)       Start     Ordered    05/28/24 1035  Discharge patient  Once        Expected Discharge Date: 05/28/24   Expected Discharge Time: Morning   Discharge Disposition: Home or Self Care   Physician of Record for Attribution - Please select from Treatment Team: JAZZ ALMONTE [[id number]]   Review needed by CMO to determine Physician of Record: No      Question Answer Comment   Physician of Record for Attribution - Please select from Treatment Team JAZZ ALMONTE    Review needed by CMO to determine Physician of Record No        05/28/24 2778

## 2024-05-28 NOTE — H&P
[facility] Medicine Services  History & Physical    Patient Name: Madeleine Hook  : 1995  MRN: [id number]  Primary Care Physician:  Provider, No Known  Date of admission: 2024  Date and Time of Service: 24 at 0345    Subjective      Chief Complaint: syncope     History of Present Illness: Madeleine Hook is a  very pleasant 29 y.o. female with a CMH of anxiety followed by outpatient psychiatry, tobacco use and is currently 6 weeks pregnant who presented to [facility] on 2024 with multiple episodes of syncope.  He denies any injury or chest pain.  Last menstrual period was 2024.  This is her second pregnancy.  She has 1 child at home.  She reports a history of bipolar disorder and anxiety.  She reports she has been taking he Prozac and Lamictal that she asked her psychiatrist he said it was okay with pregnancy.  She has not been taking propranolol that she had taken in the past due to pregnancy.  She denies any vaginal bleeding or discharge abdominal pain or back pain no fevers cough congestion or headache.  Does report some increased anxiety.  Apparently in the emergency department she had an episode of hyperventilation and heart rate went up to 178 there was initial consideration of giving adenosine but this resolved without intervention.  She Also reports some nausea and vomiting difficulty keeping down p.o. intake since Friday.  He denies any abdominal pain or hematemesis.  She reports she had nausea and vomiting with her last pregnancy.  Labs today show sodium 135 potassium 3.2 chloride 97 CO2 19.6 glucose 104 anion gap 18.4 hCG quantitative 34,299 CBC is normal.  Urinalysis shows trace leukocytes negative nitrates 6-10 WBCs trace bacteria 7-12 epithelial cells.  Denies any increase frequency of urination or dysuria.  EKG shows normal sinus rhythm heart rate 90.  His EKG showed heart rate 97 normal no acute ST elevation or depression.  She was given a 1 L fluid  bolus in the emergency department potassium replacement, doxylamine and Reglan.  She be admitted for IV fluid hydration and use cardiac monitoring.  2D echo ordered given syncope and SVT episode.  OB/GYN consulted.      Review of Systems   Constitutional: Negative.    HENT: Negative.     Eyes: Negative.    Respiratory: Negative.     Cardiovascular: Negative.    Gastrointestinal:  Positive for nausea and vomiting.   Endocrine: Negative.    Genitourinary: Negative.    Musculoskeletal: Negative.    Skin: Negative.    Allergic/Immunologic: Negative.    Neurological:  Positive for syncope.   Hematological: Negative.    Psychiatric/Behavioral:  The patient is nervous/anxious.    All other systems reviewed and are negative.      Personal History     Past Medical History:   Diagnosis Date    Anxiety        Past Surgical History:   Procedure Laterality Date    WISDOM TOOTH EXTRACTION         Family History: family history is not on file. Otherwise pertinent FHx was reviewed and not pertinent to current issue.    Social History:  reports that she has been smoking cigarettes. She has a 2 pack-year smoking history. She does not have any smokeless tobacco history on file. She reports that she does not drink alcohol and does not use drugs.    Home Medications:  Prior to Admission Medications       Prescriptions Last Dose Informant Patient Reported? Taking?    ibuprofen (ADVIL,MOTRIN) 600 MG tablet   No No    Take 1 tablet by mouth Every 6 (Six) Hours.    metoclopramide (REGLAN) 10 MG tablet   No No    Take 1 tablet by mouth 3 (Three) Times a Day As Needed (nausea).    sertraline (ZOLOFT) 100 MG tablet   Yes No    Take 100 mg by mouth Daily.              Allergies:  No Known Allergies    Objective      Vitals:   Temp:  [98.6 °F (37 °C)] 98.6 °F (37 °C)  Heart Rate:  [] 93  Resp:  [16-37] 17  BP: (116-140)/(73-90) 130/89  Body mass index is 20.7 kg/m².  Physical Exam  Vitals reviewed.   Constitutional:       Appearance:  Normal appearance. She is normal weight.   HENT:      Head: Normocephalic and atraumatic.      Right Ear: External ear normal.      Left Ear: External ear normal.      Nose: Nose normal.      Mouth/Throat:      Mouth: Mucous membranes are moist.   Eyes:      Extraocular Movements: Extraocular movements intact.   Cardiovascular:      Rate and Rhythm: Normal rate and regular rhythm.      Pulses: Normal pulses.      Heart sounds: Normal heart sounds.   Pulmonary:      Effort: Pulmonary effort is normal.      Breath sounds: Normal breath sounds.   Abdominal:      Palpations: Abdomen is soft.   Genitourinary:     Comments: deferred  Musculoskeletal:         General: Normal range of motion.      Cervical back: Normal range of motion and neck supple.   Skin:     General: Skin is warm and dry.   Neurological:      General: No focal deficit present.      Mental Status: She is alert and oriented to person, place, and time.   Psychiatric:         Mood and Affect: Mood normal.         Behavior: Behavior normal.         Thought Content: Thought content normal.         Judgment: Judgment normal.         Diagnostic Data:  Lab Results (last 24 hours)       Procedure Component Value Units Date/Time    Urine Culture - Urine, Urine, Clean Catch [[id number]] Collected: 05/28/24 0121    Specimen: Urine, Clean Catch Updated: 05/28/24 0345    Urinalysis With Microscopic If Indicated (No Culture) - Urine, Clean Catch [[id number]]  (Abnormal) Collected: 05/28/24 0121    Specimen: Urine, Clean Catch Updated: 05/28/24 0135     Color, UA Yellow     Appearance, UA Slightly Cloudy     pH, UA 6.0     Specific Gravity, UA 1.027     Glucose, UA Negative     Ketones, UA >=160 mg/dL (4+)     Bilirubin, UA Negative     Blood, UA Small (1+)     Protein, UA 30 mg/dL (1+)     Leuk Esterase, UA Trace     Nitrite, UA Negative     Urobilinogen, UA 1.0 E.U./dL    Urinalysis, Microscopic Only - Urine, Clean Catch [[id number]]  (Abnormal) Collected: 05/28/24 0121     Specimen: Urine, Clean Catch Updated: 05/28/24 0135     RBC, UA 6-10 /HPF      WBC, UA 6-10 /HPF      Bacteria, UA Trace /HPF      Squamous Epithelial Cells, UA 7-12 /HPF      Hyaline Casts, UA 3-6 /LPF      Methodology Automated Microscopy    hCG, Quantitative, Pregnancy [[id number]] Collected: 05/27/24 2214    Specimen: Blood Updated: 05/27/24 2257     HCG Quantitative 34,299.00 mIU/mL     Narrative:      HCG Ranges by Gestational Age    Females - non-pregnant premenopausal   </= 1mIU/mL HCG  Females - postmenopausal               </= 7mIU/mL HCG    3 Weeks       5.4   -      72 mIU/mL  4 Weeks      10.2   -     708 mIU/mL  5 Weeks       217   -   8,245 mIU/mL  6 Weeks       152   -  32,177 mIU/mL  7 Weeks     4,059   - 153,767 mIU/mL  8 Weeks    31,366   - 149,094 mIU/mL  9 Weeks    59,109   - 135,901 mIU/mL  10 Weeks   44,186   - 170,409 mIU/mL  12 Weeks   27,107   - 201,615 mIU/mL  14 Weeks   24,302   -  93,646 mIU/mL  15 Weeks   12,540   -  69,747 mIU/mL  16 Weeks    8,904   -  55,332 mIU/mL  17 Weeks    8,240   -  51,793 mIU/mL  18 Weeks    9,649   -  55,271 mIU/mL      Comprehensive Metabolic Panel [[id number]]  (Abnormal) Collected: 05/27/24 2214    Specimen: Blood Updated: 05/27/24 2250     Glucose 104 mg/dL      BUN 9 mg/dL      Creatinine 0.64 mg/dL      Sodium 135 mmol/L      Potassium 3.2 mmol/L      Comment: Slight hemolysis detected by analyzer. Result may be falsely elevated.        Chloride 97 mmol/L      CO2 19.6 mmol/L      Calcium 10.3 mg/dL      Total Protein 8.5 g/dL      Albumin 5.3 g/dL      ALT (SGPT) 8 U/L      AST (SGOT) 24 U/L      Alkaline Phosphatase 78 U/L      Total Bilirubin 0.8 mg/dL      Globulin 3.2 gm/dL      A/G Ratio 1.7 g/dL      BUN/Creatinine Ratio 14.1     Anion Gap 18.4 mmol/L      eGFR 124.4 mL/min/1.73     Narrative:      GFR Normal >60  Chronic Kidney Disease <60  Kidney Failure <15      [place] Draw [[id number]] Collected: 05/27/24 6482    Specimen: Blood Updated:  05/27/24 2232    Narrative:      The following orders were created for panel order [place] Draw.  Procedure                               Abnormality         Status                     ---------                               -----------         ------                     Green Top (Gel)[[id number]]                                  Final result               Lavender Top[[id number]]                                     Final result               Gold Top - SST[[id number]]                                   Final result               Light Blue Top[[id number]]                                   Final result                 Please view results for these tests on the individual orders.    Green Top (Gel) [[id number]] Collected: 05/27/24 2214    Specimen: Blood Updated: 05/27/24 2232     Extra Tube Hold for add-ons.     Comment: Auto resulted.       Lavender Top [[id number]] Collected: 05/27/24 2214    Specimen: Blood Updated: 05/27/24 2232     Extra Tube hold for add-on     Comment: Auto resulted       Gold Top - SST [[id number]] Collected: 05/27/24 2214    Specimen: Blood Updated: 05/27/24 2232     Extra Tube Hold for add-ons.     Comment: Auto resulted.       Light Blue Top [[id number]] Collected: 05/27/24 2214    Specimen: Blood Updated: 05/27/24 2232     Extra Tube Hold for add-ons.     Comment: Auto resulted       CBC & Differential [[id number]]  (Abnormal) Collected: 05/27/24 2214    Specimen: Blood Updated: 05/27/24 2223    Narrative:      The following orders were created for panel order CBC & Differential.  Procedure                               Abnormality         Status                     ---------                               -----------         ------                     CBC Auto Differential[[id number]]        Abnormal            Final result                 Please view results for these tests on the individual orders.    CBC Auto Differential [[id number]]  (Abnormal) Collected: 05/27/24 2214    Specimen: Blood  Updated: 05/27/24 2223     WBC 10.35 10*3/mm3      RBC 4.89 10*6/mm3      Hemoglobin 14.5 g/dL      Hematocrit 41.4 %      MCV 84.7 fL      MCH 29.7 pg      MCHC 35.0 g/dL      RDW 12.7 %      RDW-SD 38.5 fl      MPV 11.0 fL      Platelets 287 10*3/mm3      Neutrophil % 69.6 %      Lymphocyte % 20.7 %      Monocyte % 9.0 %      Eosinophil % 0.1 %      Basophil % 0.3 %      Immature Grans % 0.3 %      Neutrophils, Absolute 7.21 10*3/mm3      Lymphocytes, Absolute 2.14 10*3/mm3      Monocytes, Absolute 0.93 10*3/mm3      Eosinophils, Absolute 0.01 10*3/mm3      Basophils, Absolute 0.03 10*3/mm3      Immature Grans, Absolute 0.03 10*3/mm3      nRBC 0.0 /100 WBC              Imaging Results (Last 24 Hours)       Procedure Component Value Units Date/Time    US Ob Transvaginal [[id number]] Collected: 05/28/24 0100     Updated: 05/28/24 0104    Narrative:      US OB < 14 WEEKS SINGLE OR FIRST GESTATION  US OB TRANSVAGINAL    Date of Exam: 5/28/2024 12:36 AM EDT    Indication: pregnany, syncope..    Comparison: No comparisons available.    Technique: Grayscale and color Doppler transabdominal and transvaginal ultrasound was performed for maternal and fetal evaluation of a first trimester single gestation.  Images were obtained per protocol.      Findings:  The uterus measures 8.6 x 3.4 x 6.4 cm. A gestational sac is noted in the fundal endometrial canal. Yolk sac and fetal pole identified. Fetal heart rate is 111 bpm. The estimated gestational age is 6 weeks 1 day. No perigestational hemorrhage.     The right ovary measures 2.8 x 2.4 x 2.5 cm and the left ovary measures 2.1 x 2.0 x 1.9 cm. Ovarian vascularity appears intact.  There is no evidence of a solid ovarian mass.    There is no significant free fluid identified within the pelvis.      Impression:      Impression:  Solitary viable intrauterine pregnancy without complicating features.        Electronically Signed: Preston Wagner MD    5/28/2024 1:02 AM EDT     Workstation ID: [id number]    US Ob < 14 Weeks Single or First Gestation [[id number]] Collected: 05/28/24 0100     Updated: 05/28/24 0104    Narrative:      US OB < 14 WEEKS SINGLE OR FIRST GESTATION  US OB TRANSVAGINAL    Date of Exam: 5/28/2024 12:36 AM EDT    Indication: pregnany, syncope..    Comparison: No comparisons available.    Technique: Grayscale and color Doppler transabdominal and transvaginal ultrasound was performed for maternal and fetal evaluation of a first trimester single gestation.  Images were obtained per protocol.      Findings:  The uterus measures 8.6 x 3.4 x 6.4 cm. A gestational sac is noted in the fundal endometrial canal. Yolk sac and fetal pole identified. Fetal heart rate is 111 bpm. The estimated gestational age is 6 weeks 1 day. No perigestational hemorrhage.     The right ovary measures 2.8 x 2.4 x 2.5 cm and the left ovary measures 2.1 x 2.0 x 1.9 cm. Ovarian vascularity appears intact.  There is no evidence of a solid ovarian mass.    There is no significant free fluid identified within the pelvis.      Impression:      Impression:  Solitary viable intrauterine pregnancy without complicating features.        Electronically Signed: Preston Wagner MD    5/28/2024 1:02 AM EDT    Workstation ID: [id number]              Assessment & Plan        This is a 29 y.o. female with:    Active and Resolved Problems  Active Hospital Problems    Diagnosis  POA    **Syncope [R55]  Yes     Priority: High    Pregnancy [Z34.90]  Not Applicable     Priority: Medium    Nausea and vomiting [R11.2]  Yes     Priority: Medium    Tobacco use [Z72.0]  Yes    Anxiety during pregnancy [O99.340, F41.9]  Yes      Resolved Hospital Problems   No resolved problems to display.     Syncope, may be pregnancy or dehydration related but cannot rule out underlying cardiac etiology, 2D echo in a.m., continuous cardiac monitoring, consider cardiology consult if indicated fall precautions    Pregnancy 6 weeks gestation, viable  per ultrasound report, OB/GYN consulted    Nausea and vomiting without abdominal pain may be secondary to pregnancy, 4 mg IV Zofran every 6 hours as needed, IV fluid hydration    Tobacco use, reports vapes encourage cessation     Anxiety during pregnancy, sees outpatient psychiatry, reports has been taking home Lamictal and Prozac, defer to OB/GYN for safety, has not been taking home propranolol due to pregnancy.      DVT prophylaxis:  Mechanical DVT prophylaxis orders are present.        The patient desires to be as follows:    CODE STATUS:    Code Status (Patient has no pulse and is not breathing): CPR (Attempt to Resuscitate)  Medical Interventions (Patient has pulse or is breathing): Full Support            Admission Status:  I believe this patient meets observation status.    Expected Length of Stay: I day     PDMP and Medication Dispenses via Sidebar reviewed and consistent with patient reported medications.    I discussed the patient's findings and my recommendations with patient and family.      Signature:     This document has been electronically signed by SHAE Maier on May 28, 2024 04:56 EDT   [facility] Hospitalist Team

## 2024-05-28 NOTE — ED NOTES
Pt has had 3 episodes of passing out today. She passed out once this morning, once at 1630, and once while in the car on the way here. Pt lost consciousness all three times. At 1630, pt had passed out on the bathroom floor and hit the back of her head. Pt has been nauseated since Friday and has not been able to keep anything down today. Pt had a positive pregnancy test today. Pt has not taken her mood stabilizer and anxiety meds today due to her having a positive pregnancy test.

## 2024-05-28 NOTE — ED NOTES
Pts heart rate jumped up in the 170's. Provider notified. Provider came in room to assess pt. Provider stated to get adenosine and obtain a 12 lead EKG. When arriving back to room, pts heart rate had went down. Provider stated to hold adenosine

## 2024-05-28 NOTE — ED PROVIDER NOTES
Subjective   Chief Complaint   Patient presents with    Syncope       History of Present Illness  Patient is a 29-year-old female presents emergency department with complaint of multiple episodes of syncope.  She reports that she has been having multiple episodes of nausea and vomiting.  Unable to tolerate oral food and fluids,  patient does report she had a positive pregnancy test at home.  Her last menstrual period was 4/1/2024.  This is her second pregnancy.  She has 1 child at home.  No pregnancy complications previously.  Does report a history of bipolar, she has a history of anxiety.  She does report she typically takes propranolol for her anxiety but was awaiting her provider to let her know that it was appropriate for pregnancy.  Denies any vaginal bleeding or discharge.  No fevers.  Patient denies any severe pelvic pain or abdominal pain.  She is not having any chest pain or shortness of breath.  Does not have any headaches.  She had no head injury with her syncopal episodes.  No visual disturbances, speech disturbances, unilateral weakness, numbness or tingling.  Review of Systems   Constitutional:  Negative for chills and fever.   Eyes:  Negative for photophobia and visual disturbance.   Respiratory:  Negative for shortness of breath.    Cardiovascular:  Positive for palpitations. Negative for chest pain and leg swelling.   Gastrointestinal:  Positive for nausea and vomiting. Negative for abdominal pain and diarrhea.   Genitourinary:  Negative for difficulty urinating, dysuria, pelvic pain, vaginal bleeding and vaginal discharge.   Musculoskeletal:  Negative for back pain, neck pain and neck stiffness.   Skin:  Negative for color change and rash.   Neurological:  Positive for dizziness, syncope and light-headedness. Negative for tremors, seizures, facial asymmetry, speech difficulty, weakness, numbness and headaches.   Psychiatric/Behavioral:  Negative for confusion. The patient is nervous/anxious.         Past Medical History:   Diagnosis Date    Anxiety        No Known Allergies    Past Surgical History:   Procedure Laterality Date    WISDOM TOOTH EXTRACTION         History reviewed. No pertinent family history.    Social History     Socioeconomic History    Marital status:    Tobacco Use    Smoking status: Every Day     Current packs/day: 0.50     Average packs/day: 0.5 packs/day for 4.0 years (2.0 ttl pk-yrs)     Types: Cigarettes   Substance and Sexual Activity    Alcohol use: Never    Drug use: Never           Objective   Physical Exam  Vitals and nursing note reviewed.   Constitutional:       General: She is in acute distress.      Appearance: She is diaphoretic.      Comments: Tachypneic, tearful   HENT:      Head: Normocephalic and atraumatic.      Nose: Nose normal.      Mouth/Throat:      Mouth: Mucous membranes are moist.      Pharynx: Oropharynx is clear.   Eyes:      Extraocular Movements: Extraocular movements intact.      Conjunctiva/sclera: Conjunctivae normal.      Pupils: Pupils are equal, round, and reactive to light.   Cardiovascular:      Rate and Rhythm: Regular rhythm. Tachycardia present.      Heart sounds: Normal heart sounds. No murmur heard.     No friction rub. No gallop.   Pulmonary:      Effort: Pulmonary effort is normal.      Breath sounds: Normal breath sounds.   Abdominal:      General: Bowel sounds are normal.      Palpations: Abdomen is soft.   Musculoskeletal:         General: Normal range of motion.      Cervical back: Normal range of motion and neck supple. No rigidity.      Right lower leg: No edema.      Left lower leg: No edema.      Comments: Patient noted to have carpopedal spasm   Skin:     General: Skin is warm and dry.      Capillary Refill: Capillary refill takes less than 2 seconds.   Neurological:      General: No focal deficit present.      Mental Status: She is alert and oriented to person, place, and time.   Psychiatric:         Mood and Affect: Mood  "is anxious. Affect is tearful.         Behavior: Behavior is cooperative.         Procedures           ED Course  ED Course as of 05/28/24 0537   Mon May 27, 2024   2224 I was asked by nursing to evaluate the patient.  Heart rate noted to be in the 180s.  Patient hyperventilating, has carpopedal spasms.  In speaking to the patient, she did decrease her breathing, her heart rate has slowed into the 120s.  Will proceed with syncope workup [LB]   2224 Pt .   She did have a brief syncopal episode per nursing.   Upon my entering the room the patient is talking, appropriate neurologically.  [LB]   Tue May 28, 2024   0155 Patient appears resting comfortably, no acute distress.  Heart rate much improved [LB]   0334 Temp: 98.6 °F (37 °C) [LB]   0350 Consult with dr. Morales, ob/gyn [LB]      ED Course User Index  [LB] Zen, Tatianafabian Haider, APRN      /89   Pulse 93   Temp 98.6 °F (37 °C) (Oral)   Resp 17   Ht 160 cm (63\")   Wt 53 kg (116 lb 13.5 oz)   LMP 04/11/2024 (Exact Date)   SpO2 100%   BMI 20.70 kg/m²   Medications   sodium chloride 0.9 % flush 10 mL (has no administration in time range)   sodium chloride 0.9 % flush 10 mL (has no administration in time range)   vitamin B-6 (PYRIDOXINE) tablet 25 mg (25 mg Oral Given 5/28/24 0311)   sodium chloride 0.9 % infusion (100 mL/hr Intravenous New Bag 5/28/24 0415)   Potassium Replacement - Follow Nurse / BPA Driven Protocol (has no administration in time range)   ondansetron (ZOFRAN) injection 4 mg (has no administration in time range)   sodium chloride 0.9 % bolus 1,000 mL (0 mL Intravenous Stopped 5/28/24 0415)   potassium chloride (KLOR-CON M20) CR tablet 40 mEq (40 mEq Oral Given 5/28/24 0311)   doxylamine (UNISOM) tablet 25 mg (25 mg Oral Given 5/28/24 0311)   metoclopramide (REGLAN) injection 5 mg (5 mg Intravenous Given 5/28/24 0335)   cefTRIAXone (ROCEPHIN) 1,000 mg in sodium chloride 0.9 % 100 mL MBP (1,000 mg Intravenous New Bag 5/28/24 2277) "     US Ob Transvaginal    Result Date: 5/28/2024  Impression: Solitary viable intrauterine pregnancy without complicating features. Electronically Signed: Preston Wagner MD  5/28/2024 1:02 AM EDT  Workstation ID: [id number]    US Ob < 14 Weeks Single or First Gestation    Result Date: 5/28/2024  Impression: Solitary viable intrauterine pregnancy without complicating features. Electronically Signed: Preston Wagner MD  5/28/2024 1:02 AM EDT  Workstation ID: [id number]   Lab Results (last 24 hours)       Procedure Component Value Units Date/Time    CBC & Differential [[id number]]  (Abnormal) Collected: 05/27/24 2214    Specimen: Blood Updated: 05/27/24 2223    Narrative:      The following orders were created for panel order CBC & Differential.  Procedure                               Abnormality         Status                     ---------                               -----------         ------                     CBC Auto Differential[[id number]]        Abnormal            Final result                 Please view results for these tests on the individual orders.    Comprehensive Metabolic Panel [[id number]]  (Abnormal) Collected: 05/27/24 2214    Specimen: Blood Updated: 05/27/24 2250     Glucose 104 mg/dL      BUN 9 mg/dL      Creatinine 0.64 mg/dL      Sodium 135 mmol/L      Potassium 3.2 mmol/L      Comment: Slight hemolysis detected by analyzer. Result may be falsely elevated.        Chloride 97 mmol/L      CO2 19.6 mmol/L      Calcium 10.3 mg/dL      Total Protein 8.5 g/dL      Albumin 5.3 g/dL      ALT (SGPT) 8 U/L      AST (SGOT) 24 U/L      Alkaline Phosphatase 78 U/L      Total Bilirubin 0.8 mg/dL      Globulin 3.2 gm/dL      A/G Ratio 1.7 g/dL      BUN/Creatinine Ratio 14.1     Anion Gap 18.4 mmol/L      eGFR 124.4 mL/min/1.73     Narrative:      GFR Normal >60  Chronic Kidney Disease <60  Kidney Failure <15      hCG, Quantitative, Pregnancy [[id number]] Collected: 05/27/24 2214    Specimen: Blood Updated:  05/27/24 2257     HCG Quantitative 34,299.00 mIU/mL     Narrative:      HCG Ranges by Gestational Age    Females - non-pregnant premenopausal   </= 1mIU/mL HCG  Females - postmenopausal               </= 7mIU/mL HCG    3 Weeks       5.4   -      72 mIU/mL  4 Weeks      10.2   -     708 mIU/mL  5 Weeks       217   -   8,245 mIU/mL  6 Weeks       152   -  32,177 mIU/mL  7 Weeks     4,059   - 153,767 mIU/mL  8 Weeks    31,366   - 149,094 mIU/mL  9 Weeks    59,109   - 135,901 mIU/mL  10 Weeks   44,186   - 170,409 mIU/mL  12 Weeks   27,107   - 201,615 mIU/mL  14 Weeks   24,302   -  93,646 mIU/mL  15 Weeks   12,540   -  69,747 mIU/mL  16 Weeks    8,904   -  55,332 mIU/mL  17 Weeks    8,240   -  51,793 mIU/mL  18 Weeks    9,649   -  55,271 mIU/mL      CBC Auto Differential [[id number]]  (Abnormal) Collected: 05/27/24 2214    Specimen: Blood Updated: 05/27/24 2223     WBC 10.35 10*3/mm3      RBC 4.89 10*6/mm3      Hemoglobin 14.5 g/dL      Hematocrit 41.4 %      MCV 84.7 fL      MCH 29.7 pg      MCHC 35.0 g/dL      RDW 12.7 %      RDW-SD 38.5 fl      MPV 11.0 fL      Platelets 287 10*3/mm3      Neutrophil % 69.6 %      Lymphocyte % 20.7 %      Monocyte % 9.0 %      Eosinophil % 0.1 %      Basophil % 0.3 %      Immature Grans % 0.3 %      Neutrophils, Absolute 7.21 10*3/mm3      Lymphocytes, Absolute 2.14 10*3/mm3      Monocytes, Absolute 0.93 10*3/mm3      Eosinophils, Absolute 0.01 10*3/mm3      Basophils, Absolute 0.03 10*3/mm3      Immature Grans, Absolute 0.03 10*3/mm3      nRBC 0.0 /100 WBC     Urinalysis With Microscopic If Indicated (No Culture) - Urine, Clean Catch [[id number]]  (Abnormal) Collected: 05/28/24 0121    Specimen: Urine, Clean Catch Updated: 05/28/24 0135     Color, UA Yellow     Appearance, UA Slightly Cloudy     pH, UA 6.0     Specific Gravity, UA 1.027     Glucose, UA Negative     Ketones, UA >=160 mg/dL (4+)     Bilirubin, UA Negative     Blood, UA Small (1+)     Protein, UA 30 mg/dL (1+)     Leuk  Esterase, UA Trace     Nitrite, UA Negative     Urobilinogen, UA 1.0 E.U./dL    Urinalysis, Microscopic Only - Urine, Clean Catch [[id number]]  (Abnormal) Collected: 05/28/24 0121    Specimen: Urine, Clean Catch Updated: 05/28/24 0135     RBC, UA 6-10 /HPF      WBC, UA 6-10 /HPF      Bacteria, UA Trace /HPF      Squamous Epithelial Cells, UA 7-12 /HPF      Hyaline Casts, UA 3-6 /LPF      Methodology Automated Microscopy    Urine Culture - Urine, Urine, Clean Catch [[id number]] Collected: 05/28/24 0121    Specimen: Urine, Clean Catch Updated: 05/28/24 0345                                                 Medical Decision Making  Problems Addressed:  Dehydration: complicated acute illness or injury  Less than 8 weeks gestation of pregnancy: complicated acute illness or injury  Nausea and vomiting, unspecified vomiting type: complicated acute illness or injury  Syncope, unspecified syncope type: complicated acute illness or injury    Amount and/or Complexity of Data Reviewed  Labs: ordered.  Radiology: ordered.  ECG/medicine tests: ordered.    Risk  OTC drugs.  Prescription drug management.  Decision regarding hospitalization.      EKG interpreted independently per ED attending physican-initial EKG sinus rhythm rate of 97.  No acute ST changes.  Repeat EKG attempted during patient's tachycardic episode, however she had already converted back to sinus rhythm with a rate of 90 no acute ST changes.  She is no previous available.  Pt was Placed on appropriate monitoring.  Differential diagnoses considered for patient presentation, this list is not all inclusive of diagnoses considered: Electrolyte imbalance, arrhythmia, hyperventilation, ectopic pregnancy  Patient presents to the ED for the above complaint, underwent the above, exam and workup. Upon initial exam of the patient, I was asked to come to the room by nursing staff her heart rate was 178.  Patient noted to be hyperventilating, with carpopedal spasm.  Initially  considered giving adenosine, in speaking to the patient, she was able to slow her breathing down, and her heart rate has decreased, without intervention from adenosine.  Hemoglobin hematocrit and platelets are normal.  Platelet cell count is normal.  Potassium decreased at 3.2.  CO2 was noted be 19, her anion gap 18.4 she was noted to be hyperventilating upon initial exam.  Quantitative hCG 34,299.  UA concerning for infection, as well as dehydration.  She is trace bacteria, 6- white blood cells.  She has greater than 160 ketones.  She was hydrated, given Unisom and B12 for nausea, was actually improving however after her p.o. challenge and attempted p.o. medication she vomited.  She has not had any recurrence of her tachycardia or syncopal episodes.  She is awake alert and oriented, her abdominal exam is benign.  Grossly normal neurological exam..  Patient will be admitted for further evaluation and management.  I consulted with hospitalist for admission given her syncopal episodes, nausea, vomiting, dehydration.  They did request a consult from OB/GYN spoke with OB/GYN agrees with medicine consultation  Disposition: I discussed with the patient their test results, work-up here in the emergency department, and need for admission and further evaluation. Patient is agreeable to the plan of care. At time of disposition patient's VS are reviewed, and patient without acute distress.  Opportunity was provided for questions at the bedside, all questions and concerns were addressed.  Note Disclaimer: At [facility], we believe that sharing information builds trust and better relationships. You are receiving this note because you recently visited [facility]. It is possible you will see health information before a provider has talked with you about it. This kind of information can be easy to misunderstand. To help you fully understand what it means for your health, we urge you to discuss this note with your  provider  Note dictated utilizing Dragon Dictation.  Appropriate PPE worn during patient interactions.        Final diagnoses:   Syncope, unspecified syncope type   Less than 8 weeks gestation of pregnancy   Nausea and vomiting, unspecified vomiting type   Dehydration       ED Disposition  ED Disposition       ED Disposition   Decision to Admit    Condition   --    Comment   Level of Care: Telemetry [5]   Admitting Physician: JOANNA CHAWLA [[id number]]   Attending Physician: JOANNA CHAWLA [[id number]]                 No follow-up provider specified.       Medication List      No changes were made to your prescriptions during this visit.            Tatiana Zaldivar, APRN  05/28/24 0576

## 2024-05-28 NOTE — CASE MANAGEMENT/SOCIAL WORK
Social Work Assessment  [facility]     Patient Name: Madeleine Hook  MRN: [id number]  Today's Date: 5/28/2024    Admit Date: 5/27/2024         Discharge Plan       Row Name 05/28/24 1057       Plan    Plan Return Home with family    Plan Comments LSW met with Pt at bedside. Pt spouse and toddler were present in room. Pt reported just finding out that she is pregnant and will follow up with PCP outpatient as well as wanting the same OBGYN that she had with first pregnancy/birth. 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 Pt sees a psychiatrist OP for bipolar dx and reported that she is doing well and has no symptoms at this time. No further needs.             ATIF Ocampo, MSW    Phone: [phone]  Fax: [phone]  Email: [email or web address]

## 2024-05-28 NOTE — SIGNIFICANT NOTE
05/28/24 1052   Living Situation   Current Living Arrangements home   Potentially Unsafe Housing Conditions none   Food Insecurity   Within the past 12 months, you worried that your food would run out before you got the money to buy more. Never true   Within the past 12 months, the food you bought just didn't last and you didn't have money to get more. Never true   Transportation Needs   In the past 12 months, has lack of transportation kept you from medical appointments or from getting medications? no   In the past 12 months, has lack of transportation kept you from meetings, work, or from getting things needed for daily living? No   Utilities   In the past 12 months has the electric, gas, oil, or water company threatened to shut off services in your home? No   Abuse Screen (yes response referral indicated)   Feels Unsafe at Home or Work/School no   Feels Threatened by Someone no   Does Anyone Try to Keep You From Having Contact with Others or Doing Things Outside Your Home? no   Physical Signs of Abuse Present no   Financial Resource Strain   How hard is it for you to pay for the very basics like food, housing, medical care, and heating? Not very   Employment   Do you want help finding or keeping work or a job? I do not need or want help   Family and Community Support   If for any reason you need help with day-to-day activities such as bathing, preparing meals, shopping, managing finances, etc., do you get the help you need? I get all the help I need   How often do you feel lonely or isolated from those around you? Rarely   Education   Preferred Language English   Do you want help with school or training? For example, starting or completing job training or getting a high school diploma, GED or equivalent No   Physical Activity   On average, how many days per week do you engage in moderate to strenuous exercise (like a brisk walk)? 0 days   On average, how many minutes do you engage in exercise at this level? 0  min   Number of minutes of exercise per week (!) 0   Alcohol Use   Q1: How often do you have a drink containing alcohol? Never   Q2: How many drinks containing alcohol do you have on a typical day when you are drinking? None   Q3: How often do you have six or more drinks on one occasion? Never   Stress   Do you feel stress - tense, restless, nervous, or anxious, or unable to sleep at night because your mind is troubled all the time - these days? Only a littl   Mental Health   Little Interest or Pleasure in Doing Things 0-->not at all   Feeling Down, Depressed or Hopeless 0-->not at all   Disabilities   Concentrating, Remembering or Making Decisions Difficulty no   Doing Errands Independently Difficulty (such as shopping) no

## 2024-05-28 NOTE — DISCHARGE SUMMARY
[facility] Medicine Services  Discharge Summary    Date of Service: 2024  Patient Name: Madeleine Hook  : 1995  MRN: [id number]    Date of Admission: 2024  Discharge Diagnosis:     -Syncope, likely vasovagal  -Nausea and vomiting  -Anxiety  -Pregnancy    Date of Discharge:  2024  Primary Care Physician: Provider, No Known      Presenting Problem:   Dehydration [E86.0]  Syncope [R55]  Less than 8 weeks gestation of pregnancy [Z3A.01]  Syncope, unspecified syncope type [R55]  Nausea and vomiting, unspecified vomiting type [R11.2]    Active and Resolved Hospital Problems:  Active Hospital Problems    Diagnosis POA    **Syncope [R55] Yes    Pregnancy [Z34.90] Not Applicable    Nausea and vomiting [R11.2] Yes    Tobacco use [Z72.0] Yes    Anxiety during pregnancy [O99.340, F41.9] Yes      Resolved Hospital Problems   No resolved problems to display.         Hospital Course     Brief HPI and Hospital Course:    Pleasant 29-year-old patient previous history of anxiety, does follow-up with psychiatry and does use Prozac and Lamictal, presented to the hospital after the patient had few episode of syncope that happened after vomiting where the patient felt extremely weak lose energy, denies having any headache no palpitations no other specific complaints or symptoms just proceeding the patient passing out,  who was at the bedside did not report any tonic-clonic movement, patient did have decreased oral intake secondary to the nausea vomiting that she has been experiencing secondary to her pregnancy which she had during her previous one.     -Physical exam was normal EKG did not show any ischemic changes no abnormality telemetry monitor remained within sinus rhythm, transvaginal ultrasound showed viable pregnancy, admitting physician was planning on doing echocardiogram however the patient does not have any current artery disease and her clinical story is likely consistent with  vasovagal incident for which I do not believe the patient needs it especially that I was notified that it will not even be done today and possibly not even tomorrow    -Patient did report that she did get a dose of Reglan yesterday which helped her, it was the only medication that did actually help with nausea vomiting during her previous pregnancy, will give patient 1 week supplement of Reglan 5 mg 3 times daily as needed, recommend the patient to call and get appointment to be established with OB/GYN for pregnancy care        DISCHARGE Follow Up Recommendations for labs and diagnostics:       Reasons For Change In Medications and Indications for New Medications:      Day of Discharge     Vital Signs:  Temp:  [98.1 °F (36.7 °C)-98.6 °F (37 °C)] 98.1 °F (36.7 °C)  Heart Rate:  [] 102  Resp:  [16-37] 22  BP: (115-140)/(73-90) 123/81    Physical Exam:    General Appearance:    Alert, cooperative, in no acute distress   Head:    Normocephalic, without obvious abnormality, atraumatic   Eyes:            conjunctivae and sclerae normal, no   icterus, no pallor, corneas  clear, PERRLA   Neck:   No adenopathy, supple, trachea midline, no thyromegaly, no   carotid bruit, no JVD   Lungs:     Clear to auscultation,respirations regular, even and                  unlabored    Heart:    Regular rhythm and normal rate, normal S1 and S2, no            murmur, no gallop, no rub, no click   Abdomen:     Normal bowel sounds, no masses, no organomegaly, soft        non-tender, non-distended, no guarding, no rebound                No tenderness   Extremities:   Moves all extremities well, no edema, no cyanosis, no             redness   Lymph nodes:   No palpable adenopathy   Neurologic:   Cranial nerves 2 - 12 grossly intact, sensation intact, DTR       present and equal bilaterally          Pertinent  and/or Most Recent Results     LAB RESULTS:      Lab 05/27/24  2214   WBC 10.35   HEMOGLOBIN 14.5   HEMATOCRIT 41.4   PLATELETS  287   NEUTROS ABS 7.21*   IMMATURE GRANS (ABS) 0.03   LYMPHS ABS 2.14   MONOS ABS 0.93*   EOS ABS 0.01   MCV 84.7         Lab 05/28/24  0551 05/27/24  2214   SODIUM 137 135*   POTASSIUM 3.2* 3.2*   CHLORIDE 104 97*   CO2 18.2* 19.6*   ANION GAP 14.8 18.4*   BUN 7 9   CREATININE 0.48* 0.64   EGFR 133.3 124.4   GLUCOSE 99 104*   CALCIUM 8.9 10.3   MAGNESIUM 2.0  --          Lab 05/27/24  2214   TOTAL PROTEIN 8.5   ALBUMIN 5.3*   GLOBULIN 3.2   ALT (SGPT) 8   AST (SGOT) 24   BILIRUBIN 0.8   ALK PHOS 78                     Brief Urine Lab Results  (Last result in the past 365 days)        Color   Clarity   Blood   Leuk Est   Nitrite   Protein   CREAT   Urine HCG        05/28/24 0121 Yellow   Slightly Cloudy   Small (1+)   Trace   Negative   30 mg/dL (1+)                 Microbiology Results (last 10 days)       ** No results found for the last 240 hours. **            US Ob Transvaginal    Result Date: 5/28/2024  Impression: Impression: Solitary viable intrauterine pregnancy without complicating features. Electronically Signed: Preston Wagner MD  5/28/2024 1:02 AM EDT  Workstation ID: [id number]    US Ob < 14 Weeks Single or First Gestation    Result Date: 5/28/2024  Impression: Impression: Solitary viable intrauterine pregnancy without complicating features. Electronically Signed: Preston Wagner MD  5/28/2024 1:02 AM EDT  Workstation ID: [id number]                 Labs Pending at Discharge:  Pending Labs       Order Current Status    Urine Culture - Urine, Urine, Clean Catch In process            Procedures Performed           Consults:   Consults       Date and Time Order Name Status Description    5/28/2024  3:40 AM Inpatient Obstetrics / Gynecology Consult      5/28/2024  3:26 AM Hospitalist (on-call MD unless specified)                Discharge Details        Discharge Medications        New Medications        Instructions Start Date   metoclopramide 5 MG tablet  Commonly known as: Reglan   5 mg, Oral, 3 Times Daily  PRN             Continue These Medications        Instructions Start Date   FLUoxetine 10 MG capsule  Commonly known as: PROzac   10 mg, Oral, Daily      LaMICtal XR 50 MG tablet sustained-release 24 hour  Generic drug: lamoTRIgine ER   50 mg, Oral, Nightly             Stop These Medications      cyproheptadine 4 MG tablet  Commonly known as: PERIACTIN              No Known Allergies      Discharge Disposition:   Home or Self Care    Diet:  Hospital:  Diet Order   Procedures    Diet: Regular/House; Fluid Consistency: Thin (IDDSI 0)         Discharge Activity:         CODE STATUS:  Code Status and Medical Interventions:   Ordered at: 05/28/24 0341     Code Status (Patient has no pulse and is not breathing):    CPR (Attempt to Resuscitate)     Medical Interventions (Patient has pulse or is breathing):    Full Support         No future appointments.        Time spent on Discharge including face to face service:  >30 minutes    Signature: Electronically signed by Greg Medina MD, 05/28/24, 11:46 EDT.  [facility], operated by [facility] Hospitalist Team

## 2024-05-28 NOTE — CONSULTS
Referring Provider: Hospitalist  Reason for Consultation: Pregnancy, syncope     Patient Care Team:  Provider, No Known as PCP - General    Chief complaint syncope in early pregnancy    Subjective .     History of present illness:  Patient is a 29-year-old female who presented to ED with c/o multiple episodes of syncope.  She reported that she has been having multiple episodes of nausea and vomiting.  Syncope was noted after episodes of vomiting and standing up from that . Unable to tolerate oral food and fluids,  patient reported she had a positive pregnancy test at home.  Her LMP was 2024.  This is her second pregnancy.  She has 1 child at home.  No pregnancy complications previously. She has a h/o bipolar and anxiety. She called and left  with [facility] on Friday afternoon, but had not heard back from them.  Prenatal care with first child was at [facility] with Dr Jean-Baptiste.     Objective     Vital Signs   Vitals:    24 0533 24 0631 24 0632 24 0742   BP:  120/83  123/81   BP Location:    Left arm   Patient Position:    Sitting   Pulse: 88 96  102   Resp:    22   Temp:    98.1 °F (36.7 °C)   TempSrc:    Oral   SpO2:   100% 99%   Weight:       Height:         Temp (24hrs), Av.4 °F (36.9 °C), Min:98.1 °F (36.7 °C), Max:98.6 °F (37 °C)      Physical Exam:     General Appearance:    Alert, cooperative, in no acute distress   Abdomen:     Normal bowel sounds, no masses, no organomegaly, soft        non-tender, non-distended, no guarding, no rebound                 tenderness     Lab Results:  Lab Results (last 48 hours)       Procedure Component Value Units Date/Time    Basic Metabolic Panel [[id number]]  (Abnormal) Collected: 24 05    Specimen: Blood Updated: 24     Glucose 99 mg/dL      BUN 7 mg/dL      Creatinine 0.48 mg/dL      Sodium 137 mmol/L      Potassium 3.2 mmol/L      Chloride 104 mmol/L      CO2 18.2 mmol/L      Calcium 8.9 mg/dL       BUN/Creatinine Ratio 14.6     Anion Gap 14.8 mmol/L      eGFR 133.3 mL/min/1.73     Narrative:      GFR Normal >60  Chronic Kidney Disease <60  Kidney Failure <15      Magnesium [[id number]]  (Normal) Collected: 05/28/24 0551    Specimen: Blood Updated: 05/28/24 0626     Magnesium 2.0 mg/dL     Urine Culture - Urine, Urine, Clean Catch [[id number]] Collected: 05/28/24 0121    Specimen: Urine, Clean Catch Updated: 05/28/24 0345    Urinalysis With Microscopic If Indicated (No Culture) - Urine, Clean Catch [[id number]]  (Abnormal) Collected: 05/28/24 0121    Specimen: Urine, Clean Catch Updated: 05/28/24 0135     Color, UA Yellow     Appearance, UA Slightly Cloudy     pH, UA 6.0     Specific Gravity, UA 1.027     Glucose, UA Negative     Ketones, UA >=160 mg/dL (4+)     Bilirubin, UA Negative     Blood, UA Small (1+)     Protein, UA 30 mg/dL (1+)     Leuk Esterase, UA Trace     Nitrite, UA Negative     Urobilinogen, UA 1.0 E.U./dL    Urinalysis, Microscopic Only - Urine, Clean Catch [[id number]]  (Abnormal) Collected: 05/28/24 0121    Specimen: Urine, Clean Catch Updated: 05/28/24 0135     RBC, UA 6-10 /HPF      WBC, UA 6-10 /HPF      Bacteria, UA Trace /HPF      Squamous Epithelial Cells, UA 7-12 /HPF      Hyaline Casts, UA 3-6 /LPF      Methodology Automated Microscopy    hCG, Quantitative, Pregnancy [[id number]] Collected: 05/27/24 2214    Specimen: Blood Updated: 05/27/24 2257     HCG Quantitative 34,299.00 mIU/mL     Narrative:      HCG Ranges by Gestational Age    Females - non-pregnant premenopausal   </= 1mIU/mL HCG  Females - postmenopausal               </= 7mIU/mL HCG    3 Weeks       5.4   -      72 mIU/mL  4 Weeks      10.2   -     708 mIU/mL  5 Weeks       217   -   8,245 mIU/mL  6 Weeks       152   -  32,177 mIU/mL  7 Weeks     4,059   - 153,767 mIU/mL  8 Weeks    31,366   - 149,094 mIU/mL  9 Weeks    59,109   - 135,901 mIU/mL  10 Weeks   44,186   - 170,409 mIU/mL  12 Weeks   27,107   - 201,615 mIU/mL  14  Weeks   24,302   -  93,646 mIU/mL  15 Weeks   12,540   -  69,747 mIU/mL  16 Weeks    8,904   -  55,332 mIU/mL  17 Weeks    8,240   -  51,793 mIU/mL  18 Weeks    9,649   -  55,271 mIU/mL      Comprehensive Metabolic Panel [[id number]]  (Abnormal) Collected: 05/27/24 2214    Specimen: Blood Updated: 05/27/24 2250     Glucose 104 mg/dL      BUN 9 mg/dL      Creatinine 0.64 mg/dL      Sodium 135 mmol/L      Potassium 3.2 mmol/L      Comment: Slight hemolysis detected by analyzer. Result may be falsely elevated.        Chloride 97 mmol/L      CO2 19.6 mmol/L      Calcium 10.3 mg/dL      Total Protein 8.5 g/dL      Albumin 5.3 g/dL      ALT (SGPT) 8 U/L      AST (SGOT) 24 U/L      Alkaline Phosphatase 78 U/L      Total Bilirubin 0.8 mg/dL      Globulin 3.2 gm/dL      A/G Ratio 1.7 g/dL      BUN/Creatinine Ratio 14.1     Anion Gap 18.4 mmol/L      eGFR 124.4 mL/min/1.73     Narrative:      GFR Normal >60  Chronic Kidney Disease <60  Kidney Failure <15      [place] Draw [[id number]] Collected: 05/27/24 2214    Specimen: Blood Updated: 05/27/24 2232    Narrative:      The following orders were created for panel order [place] Draw.  Procedure                               Abnormality         Status                     ---------                               -----------         ------                     Green Top (Gel)[[id number]]                                  Final result               Lavender Top[[id number]]                                     Final result               Gold Top - SST[[id number]]                                   Final result               Light Blue Top[[id number]]                                   Final result                 Please view results for these tests on the individual orders.    Green Top (Gel) [[id number]] Collected: 05/27/24 2214    Specimen: Blood Updated: 05/27/24 2232     Extra Tube Hold for add-ons.     Comment: Auto resulted.       Lavender Top [[id number]] Collected: 05/27/24 2214     Specimen: Blood Updated: 05/27/24 2232     Extra Tube hold for add-on     Comment: Auto resulted       Gold Top - SST [[id number]] Collected: 05/27/24 2214    Specimen: Blood Updated: 05/27/24 2232     Extra Tube Hold for add-ons.     Comment: Auto resulted.       Light Blue Top [[id number]] Collected: 05/27/24 2214    Specimen: Blood Updated: 05/27/24 2232     Extra Tube Hold for add-ons.     Comment: Auto resulted       CBC & Differential [[id number]]  (Abnormal) Collected: 05/27/24 2214    Specimen: Blood Updated: 05/27/24 2223    Narrative:      The following orders were created for panel order CBC & Differential.  Procedure                               Abnormality         Status                     ---------                               -----------         ------                     CBC Auto Differential[[id number]]        Abnormal            Final result                 Please view results for these tests on the individual orders.    CBC Auto Differential [[id number]]  (Abnormal) Collected: 05/27/24 2214    Specimen: Blood Updated: 05/27/24 2223     WBC 10.35 10*3/mm3      RBC 4.89 10*6/mm3      Hemoglobin 14.5 g/dL      Hematocrit 41.4 %      MCV 84.7 fL      MCH 29.7 pg      MCHC 35.0 g/dL      RDW 12.7 %      RDW-SD 38.5 fl      MPV 11.0 fL      Platelets 287 10*3/mm3      Neutrophil % 69.6 %      Lymphocyte % 20.7 %      Monocyte % 9.0 %      Eosinophil % 0.1 %      Basophil % 0.3 %      Immature Grans % 0.3 %      Neutrophils, Absolute 7.21 10*3/mm3      Lymphocytes, Absolute 2.14 10*3/mm3      Monocytes, Absolute 0.93 10*3/mm3      Eosinophils, Absolute 0.01 10*3/mm3      Basophils, Absolute 0.03 10*3/mm3      Immature Grans, Absolute 0.03 10*3/mm3      nRBC 0.0 /100 WBC             Radiology Results:  Imaging Results (Last 72 Hours)       Procedure Component Value Units Date/Time    US Ob Transvaginal [[id number]] Collected: 05/28/24 0100     Updated: 05/28/24 0104    Narrative:      US OB < 14 WEEKS SINGLE  OR FIRST GESTATION  US OB TRANSVAGINAL    Date of Exam: 5/28/2024 12:36 AM EDT    Indication: pregnany, syncope..    Comparison: No comparisons available.    Technique: Grayscale and color Doppler transabdominal and transvaginal ultrasound was performed for maternal and fetal evaluation of a first trimester single gestation.  Images were obtained per protocol.      Findings:  The uterus measures 8.6 x 3.4 x 6.4 cm. A gestational sac is noted in the fundal endometrial canal. Yolk sac and fetal pole identified. Fetal heart rate is 111 bpm. The estimated gestational age is 6 weeks 1 day. No perigestational hemorrhage.     The right ovary measures 2.8 x 2.4 x 2.5 cm and the left ovary measures 2.1 x 2.0 x 1.9 cm. Ovarian vascularity appears intact.  There is no evidence of a solid ovarian mass.    There is no significant free fluid identified within the pelvis.      Impression:      Impression:  Solitary viable intrauterine pregnancy without complicating features.        Electronically Signed: Preston Wagner MD    5/28/2024 1:02 AM EDT    Workstation ID: [id number]    US Ob < 14 Weeks Single or First Gestation [[id number]] Collected: 05/28/24 0100     Updated: 05/28/24 0104    Narrative:      US OB < 14 WEEKS SINGLE OR FIRST GESTATION  US OB TRANSVAGINAL    Date of Exam: 5/28/2024 12:36 AM EDT    Indication: pregnany, syncope..    Comparison: No comparisons available.    Technique: Grayscale and color Doppler transabdominal and transvaginal ultrasound was performed for maternal and fetal evaluation of a first trimester single gestation.  Images were obtained per protocol.      Findings:  The uterus measures 8.6 x 3.4 x 6.4 cm. A gestational sac is noted in the fundal endometrial canal. Yolk sac and fetal pole identified. Fetal heart rate is 111 bpm. The estimated gestational age is 6 weeks 1 day. No perigestational hemorrhage.     The right ovary measures 2.8 x 2.4 x 2.5 cm and the left ovary measures 2.1 x 2.0 x 1.9 cm.  Ovarian vascularity appears intact.  There is no evidence of a solid ovarian mass.    There is no significant free fluid identified within the pelvis.      Impression:      Impression:  Solitary viable intrauterine pregnancy without complicating features.        Electronically Signed: Preston Wagner MD    5/28/2024 1:02 AM EDT    Workstation ID: [id number]            Assessment & Plan       Syncope    Anxiety during pregnancy    Tobacco use    Pregnancy    Nausea and vomiting      6w with N/V and syncope  US with VIUP with +FHT  Recommend f/u with psych r/g medication  Continue present mgmt for N/V  Rec f/u with OBGYN for routine care, outpatient    I discussed the patients findings and my recommendations with patient    Nanci Guerra NP  05/28/24  08:42 EDT

## 2024-05-29 LAB — BACTERIA SPEC AEROBE CULT: NO GROWTH

## 2024-12-27 ENCOUNTER — HOSPITAL ENCOUNTER (OUTPATIENT)
Facility: HOSPITAL | Age: 29
Discharge: HOME | End: 2024-12-27
Attending: OBSTETRICS & GYNECOLOGY | Admitting: OBSTETRICS & GYNECOLOGY
Payer: OTHER GOVERNMENT

## 2024-12-27 LAB — A1 MICROGLOB PLACENTAL VAG QL: NEGATIVE

## 2024-12-27 PROCEDURE — G0463 HOSPITAL OUTPT CLINIC VISIT: HCPCS

## 2024-12-27 PROCEDURE — 59025 FETAL NON-STRESS TEST: CPT

## 2024-12-27 PROCEDURE — 84112 EVAL AMNIOTIC FLUID PROTEIN: CPT | Performed by: OBSTETRICS & GYNECOLOGY

## 2024-12-27 RX ORDER — PRENATAL VIT NO.126/IRON/FOLIC 28MG-0.8MG
1 TABLET ORAL DAILY
COMMUNITY

## 2024-12-27 NOTE — NURSING NOTE
"Patient arrived to Labor and Delivery triage with c/o contractions and leaking fluid.   at 36.3 weeks, patient receives care with Dr. Tasha Jean-Baptiste in [place].  States contractions started last night and have progressed to q 10 mins today.  Patient also reports \"leaking\" fluid.  Patient assisted to triage area, changed into patient gown, and clean catch UA collected.  Assisted to triage bed 3 and palced on external fetal monitors.  Abdomen soft and nontender, mild contraction palpated.  Fetal heart rate baseline 145 with moderate variability and accels, no decels noted.  Patient states last delivery was a precipitous delivery at home at 36.0 weeks gestation.  Patient states she was seen in office yesterday and was \"4cm\" dilated.  Urine dark and concentrated.  Amnisure collected, no obvious LOF noted.  SVE 3-4/50/-3.  Uterine irritability noted with irregular contractions.  Po hydration given.  Bed low, call light in reach.  Discussed POC with patient and spouse and they verbalize understanding.  "

## 2024-12-27 NOTE — OBED NOTES
MARTINE Cobb  Obstetric History and Physical    Chief Complaint   Patient presents with    Contractions    Leaking Fluid       Subjective     PNC provided by:   Pasquale    HPI:    Patient is a 29 y.o. female  currently at 36w3d, who presents with ctx since last night and worsening today;  now q 10 min;  pt delivered first baby at home with min pain;  no vb;  +LOF, good FM.    Her prenatal care is complicated by  anemia, bipolar, tob use;   Her previous obstetric/gynecological history is noted for is remarkable for h/o precip delivery at home    The following portions of the patients history were reviewed and updated as appropriate:   current medications, allergies, past medical history, past surgical history, past family history, past social history and current problem list.     Prenatal Information:  Prenatal Results       Initial Prenatal Labs       Test Value Reference Range Date Time    Hemoglobin ^ 11.6 g/dL 12.0 - 16.0 24 1050       14.5 g/dL 12.0 - 15.9 24 2214    Hematocrit ^ 35.6 % 36.0 - 46.0 24 1050       41.4 % 34.0 - 46.6 24 2214    Platelets ^ 260 10*3/uL 140 - 440 24 1050       287 10*3/mm3 140 - 450 24 2214    Rubella IgG        Hepatitis B SAg ^ Nonreactive  Nonreactive 24 1050    Hepatitis C Ab ^ Nonreactive  Nonreactive 24 1050    RPR        T. Pallidum Ab         ABO  O   22 1400    Rh  Positive   22 1400    Antibody Screen ^ Negative   24 1050    HIV ^ Nonreactive  Nonreactive 24 1050    Urine Culture  No growth   24 0121    Gonorrhea        Chlamydia        TSH        HgB A1c  ^ 4.5 % 4.3 - 5.6 24 1050    Varicella IgG ^ 0.9 AI  24 1050    Hemoglobinopathy Fractionation        Hemoglobinopathy (genetic testing)        Cystic fibrosis         Spinal muscular atrophy        Fragile X                  Fetal testing        Test Value Reference Range Date Time    NIPT        MSAFP        AFP-4                   2nd and 3rd Trimester       Test Value Reference Range Date Time    Hemoglobin (repeated)        Hematocrit (repeated)        Platelets  ^ 260 10*3/uL 140 - 440 07/12/24 1050       287 10*3/mm3 140 - 450 05/27/24 2214    1 hour GTT         Antibody Screen (repeated)        3rd TM syphilis scrn (repeated)  RPR         3rd TM syphilis scrn (repeated) TP-Ab        3rd TM syphilis screen TB-Ab (FTA)        Syphilis cascade test TP-Ab (EIA)        Syphilis cascade TPPA        GTT Fasting        GTT 1 Hr        GTT 2 Hr        GTT 3 Hr        Group B Strep                  Other testing        Test Value Reference Range Date Time    Parvo IgG         CMV IgG                   Drug Screening       Test Value Reference Range Date Time    Amphetamine Screen        Barbiturate Screen        Benzodiazepine Screen        Methadone Screen        Phencyclidine Screen        Opiates Screen        THC Screen        Cocaine Screen        Propoxyphene Screen        Buprenorphine Screen        Methamphetamine Screen        Oxycodone Screen        Tricyclic Antidepressants Screen                  Legend    ^: Historical                          External Prenatal Results       Pregnancy Outside Results - Transcribed From Office Records - See Scanned Records For Details       Test Value Date Time    ABO  O  03/12/22 1400    Rh  Positive  03/12/22 1400    Antibody Screen ^ Negative  07/12/24 1050    Varicella IgG ^ 0.9 AI 07/12/24 1050    Rubella       Hgb ^ 11.6 g/dL 07/12/24 1050       14.5 g/dL 05/27/24 2214    Hct ^ 35.6 % 07/12/24 1050       41.4 % 05/27/24 2214    HgB A1c  ^ 4.5 % 07/12/24 1050    1h GTT       3h GTT Fasting       3h GTT 1 hour       3h GTT 2 hour       3h GTT 3 hour        Gonorrhea (discrete)       Chlamydia (discrete)       RPR       Syphils cascade: TP-Ab (FTA)       TP-Ab       TP-Ab (EIA)       TPPA       HBsAg ^ Nonreactive  07/12/24 1050    Herpes Simplex Virus PCR       Herpes Simplex VIrus Culture        HIV ^ Nonreactive  24 1050    Hep C RNA Quant PCR       Hep C Antibody ^ Nonreactive  24 1050    AFP       NIPT       Cystic Fibrosis (Lili)       Cystic Fibroisis        Spinal Muscular atrophy       Fragile X       Group B Strep       GBS Susceptibility to Clindamycin       GBS Susceptibility to Erythromycin       Fetal Fibronectin       Genetic Testing, Maternal Blood                 Drug Screening       Test Value Date Time    Urine Drug Screen       Amphetamine Screen       Barbiturate Screen       Benzodiazepine Screen       Methadone Screen       Phencyclidine Screen       Opiates Screen       THC Screen       Cocaine Screen       Propoxyphene Screen       Buprenorphine Screen       Methamphetamine Screen       Oxycodone Screen       Tricyclic Antidepressants Screen                 Legend    ^: Historical                             Problem List:     Patient Active Problem List   Diagnosis    Postpartum care following vaginal delivery    Anxiety during pregnancy    Chlamydia    Tobacco use    Syncope    Pregnancy    Nausea and vomiting        Past OB History:     OB History    Para Term  AB Living   2 1 0 1 0 1   SAB IAB Ectopic Molar Multiple Live Births   0 0 0 0 0 1      # Outcome Date GA Lbr Heladio/2nd Weight Sex Type Anes PTL Lv   2 Current            1  22 36w0d  2526 g (5 lb 9.1 oz) F Vag-Spont None Y ALEKSANDR      Name: IRIS ALBERTO       Past Medical History: Past Medical History:   Diagnosis Date    Anxiety       Past Surgical History Past Surgical History:   Procedure Laterality Date    WISDOM TOOTH EXTRACTION        Family History: No family history on file.   Social History:  reports that she has been smoking cigarettes. She has a 2 pack-year smoking history. She does not have any smokeless tobacco history on file.   reports no history of alcohol use.   reports no history of drug use.        General ROS: Pertinent items are noted in HPI        Home  Medications:  FLUoxetine, lamoTRIgine ER, and prenatal vitamin    Allergies:  No Known Allergies    Objective       Vital Signs Range for the last 24 hours:  AFVSS  Temperature:     Temp Source:     BP:     Pulse:     Respirations:     SPO2:       Physical Examination:   General appearance - alert, well appearing, and in no distress  Mental status - alert, oriented to person, place, and time  Abdomen - soft, nontender, nondistended,   Pelvic - normal external genitalia, vulva, vagina, cervix, and uterus   Back exam - full range of motion, no tenderness or pain on motion  Neurological - alert, oriented, normal speech  Extremities - peripheral pulses normal  Skin - normal coloration and turgor, no suspicious skin lesions noted    Presentation: Vtx;  50/3-4/-3   Cervix: Method: sterile vaginal exam performed   Dilation: Cervical Dilation (cm): 3-4   Effacement: Cervical Effacement: 50   Station:         Fetal Heart Rate Assessment :  140s, R, GLTV, cat 1  Method: Fetal HR Assessment Method: external   Beats/min: Fetal HR (beats/min): 125   Baseline: Fetal HR Baseline: normal range   Variability: Fetal HR Variability: moderate (amplitude range 6 to 25 bpm)   Accels: Fetal HR Accelerations: greater than/equal to 15 bpm, lasting at least 15 seconds   Decels: Fetal HR Decelerations: absent   Tracing Category:       Uterine Assessment :  irreg  Method: Method: external tocotransducer, per patient report   Frequency (min):     Ctx Count in 10 min:     Duration:     Intensity: Contraction Intensity: mild by palpation   Faith Units:       Lab Results (last 24 hours)       Procedure Component Value Units Date/Time    Rapid Assay For ROM - Amniotic Fluid, Amniotic Sac [[id number]]  (Normal) Collected: 12/27/24 1533    Specimen: Amniotic Fluid from Amniotic Sac Updated: 12/27/24 1553     Rupture of Membranes Negative    Narrative:      This test detects tiny amounts of amniotic fluid and is  approved for use at any  "gestational age. When there is   significant presence of blood on the swab, the test can   malfunction and is not recommended (possible false positive  results). In cases of only trace amounts of blood on the swab,  the test still functions properly and will not interfere with  the test results. In very rare cases when a sample is taken  12 hours or later after a rupture, a false negative result may  occur due to obstruction of the rupture by fetus or resealing  of the amniotic sac.             GBS is unknown     Assessment & Plan     False PTL    Assessment:  1.  Intrauterine pregnancy at 36w3d gestation with reactive fetal status.    2.   labor  false  3.  Obstetrical history significant for is remarkable for home delivery of first baby  4.  GBS status: No results found for: \"STREPGPB\"    Plan:  1. Discharge to home.  As no cvx change for 4.5 hrs;  precautions given  2.  Plan of care has been reviewed with patient and patient agrees.   3.  Risks, benefits of treatment plan have been discussed.  4.  All questions have been answered.        Electronically signed by Roseanna Patton MD, 24, 3:40 PM EST.    "

## 2024-12-27 NOTE — NURSING NOTE
Dr. Patton at nurses station, informed of patient arrival, c/o, SVE, reassuring fetal tracing, uterine irritability, Amnisure collected and sent, and po hydration given.  Orders to po hydrate, await Amnisure results, and continue to monitor x 2 hours and recheck cervix.  Patient to ambulate in osborne prior to recheck.

## 2024-12-28 NOTE — NURSING NOTE
Pt. D/c home in stable condition, labor precautions give, home and follow up care instructions, pt. Voiced understanding, pt. Amb. To awaiting car without distress noted, s/o at side.

## 2024-12-28 NOTE — NON STRESS TEST
Obstetrical Non-stress Test Interpretation     Name:  Madeleine Hook  MRN: [id number]    29 y.o. female  at 36w3d    Indication: contractions      Fetal Assessment  Fetal Movement: active  Fetal HR Assessment Method: external  Fetal HR (beats/min): 135  Fetal HR Baseline: normal range  Fetal HR Variability: moderate (amplitude range 6 to 25 bpm)  Fetal HR Accelerations: episodic, greater than/equal to 15 bpm, lasting at least 15 seconds  Fetal HR Decelerations: absent  Sinusoidal Pattern Present: absent    LMP 2024 (Exact Date)     Reason for test:  contractions  Date of Test: 2024  Time frame of test:   RN NST Interpretation:  reactive for gestational age.      Roxanna Mendez RN  2024  20:32 EST

## 2024-12-28 NOTE — NURSING NOTE
at  nurses station, informed of sve with no change since admission, monitor strip viewed, orders received to d/c home.